# Patient Record
(demographics unavailable — no encounter records)

---

## 2024-10-24 NOTE — PHYSICAL EXAM
[Abdominal  Ascites] : ascites [General Appearance - Alert] : alert [General Appearance - In No Acute Distress] : in no acute distress [Sclera] : the sclera and conjunctiva were normal [Extraocular Movements] : extraocular movements were intact [] : no respiratory distress [Exaggerated Use Of Accessory Muscles For Inspiration] : no accessory muscle use [Auscultation Breath Sounds / Voice Sounds] : lungs were clear to auscultation bilaterally [Heart Rate And Rhythm] : heart rate was normal and rhythm regular [Bowel Sounds] : normal bowel sounds [Abdomen Soft] : soft [Abdomen Tenderness] : non-tender [Abdomen Mass (___ Cm)] : no abdominal mass palpated [Abnormal Walk] : normal gait [Skin Color & Pigmentation] : normal skin color and pigmentation [Oriented To Time, Place, And Person] : oriented to person, place, and time [Impaired Insight] : insight and judgment were intact [Affect] : the affect was normal [No Acute Distress] : no acute distress [No Respiratory Distress] : no respiratory distress [No Edema] : no edema [No Focal Deficits] : no focal deficits [Scleral Icterus] : No Scleral Icterus [Ascites Tense] : ascites is not tense [Jaundice] : No jaundice [Hallucinations] : ~T no ~M hallucinations [FreeTextEntry1] : No asterixis [Asterixis] : no asterixis [Depression] : no depression [Suicidal Ideation] : no suicidal ideation [de-identified] : +temporal muscle wasting [de-identified] : soft, nonprotuberant, +hernia over L mid-abdomen, +mild tenderness when pressing over hernia (easily reducible), no overlying skin changes

## 2024-10-24 NOTE — ASSESSMENT
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 Last seen on 9/30/24. - Discussed at liver selection meeting on 5/23/24 -- decision was made to DEFER pt at this time pending psychosocial clearance (RPP enrollment). Pt was counseled while inpatient on the need for RPP enrollment prior to OLT listing and he verbalized understanding.  #Decompensated cirrhosis 2/2 likely MASH: - Counseled pt to avoid hepatotoxins, alcohol use, herbal supplements. Limit acetaminophen to 2g/day. - Counseled pt on the importance of medication adherence, and to bring all his medication bottles in to every hepatology visit moving forward.  - HE: Continue Miralax 17g qd, titrate to 3-4 BMs/day (pt reports nausea with lactulose). Continue rifaximin 550mg BID.   - Ascites/Volume: Pt has been taking Lasix 40mg daily (instead of BID), aldactone 100mg daily. He states he urinates too frequently with lasix, so has not been taking PM dose. Check labs today; if Cr/lytes okay, then plan to increase Lasix to 40mg BID (pt states he is willing to try this now) and continue aldactone 100mg daily while on midodrine 10mg TID. Continue low Na diet. Monitor Cr (baseline Cr 0.7-0.8s). Continue serial LVP at [facility] (currently Q2wk) to manage early satiety/ventral hernia site discomfort. Anticipate that it will be difficult to manage pt's ascites/hernia given presence of chronic PVT. Could pt be a candidate for TIPS/DIPS? Check repeat CT A/P as below.  - Hx of SBP: Had E coli bacteremia 2/2 SBP (E coli resistant to Cipro/Bactrim) in 2/2024. Continue cefpodoxime 200mg daily for SBP ppx.  - Hx of EV bleed: EGD (4/17/24) => Diminutive EV; PHG; normal duodenum. Previously was on BB but this was stopped during [facility] admission in 4/2024; currently on midodrine for hypotension so will NOT resume BB (unless pt is able to be titrated off midodrine). Next EGD 4/2025.  - Hypotension: Continue midodrine 10mg TID, will continue at higher dose for now to facilitate diuresis.  - HCC screening: CT A/P w/wo contrast 7/2024 without focal liver lesion (AFP < 1.8 in 2/2024). Repeat imaging, AFP q6mo (next due in 1/2025).  - Chronic PVT: CT A/P (4/2024) showed main portal venous occlusive thrombosis with extension into the visualized bilateral portal veins. Was started on Eliquis during [facility] admission in 4/2024; given recent fall, he was discharged on 5/25 OFF of AC. Eliquis restarted at lower dose of 2.5mg BID on 6/3/24 -- continue. No falls since last visit. CT Abd w/wo contrast (7/2024) showed unchanged chronic calcified PVT (after ~3 months on AC). Obtain repeat CT A/P w/wo contrast now.  #Ventral Hernia:  - I informed the pt that given his decompensated cirrhosis, hernia repair at this time would be high-risk (likely to have further decompensation/risk of death post-op; also is not yet listed for OLT so would not have a backup plan if he does poorly post-op). Favor monitoring for now, with hernia repair done at time of OLT. - Plan to uptitrate diuretics as above.  LVP q1-2w as above.  - Check CT A/P as above to reassess chronic PVT; will bring pt for discussion at upcoming Liver selection meeting to see if perhaps we could consider pt for TIPS/DIPS (given his low MELD, HE has been well-controlled). - Pt requests prescription for new abd binder -- ordered.  #OLT candidacy: Eval ongoing.  Discussed at liver selection meeting on 5/23/24 -- decision was made to DEFER pt at this time pending psychosocial clearance (RPP enrollment). -- Continue RPP; follow up with Psych/SW for clearance. Transplant SW Asma to meet with pt after today's appt. Check PETH, UTox as per SW recs.  -- COL 5/24/24 (with 2d prep) => Hemorrhoids. Medium sized nonbleeding rectal varices. Mod amount of stool in entire colon interfering with visualization. 2mm sessile polyp in sigmoid colon removed (Path: Early sessile serrated polyp). Recommendations: Repeat colonoscopy for surveillance after transplant. No large masses found on this exam. Given that there was stool throughout the colon, could not rule out polyps < 6mm. Would recommend low residual diet and MiraLAX for 2 weeks as well as 2-day prep for next colonoscopy.  -- Underwent splinting of teeth #24, 25, 26 (due to aspiration risk) on 5/22/24 inpatient with Dental. Cleared from Dental standpoint for OLT. RECOMMENDATIONS: 1) Patient okay for liver transplant from dental standpoint. Recommended utilization of mouthguard with intubation. 2) Patient instructions given including soft food diet. 3) Follow up with outpatient dentist for splint removal 4) Definitive treatment of teeth #24,25,26 and comprehensive care s/p liver transplant and medical clearance.   Repeat labs today. Pt was instructed to bring in all med bottles to next visit. RTC in 6 weeks.

## 2024-10-24 NOTE — REVIEW OF SYSTEMS
[Tattoos] : tattoos [Fever] : no fever [Chills] : no chills [Night Sweats] : no night sweats [Sore throat] : no sore throat [Rhinorrhea] : no rhinorrhea [Sclera anicteric] : sclera icteric [Chest Pain] : no chest pain [Palpitations] : no palpitations [Wheezing] : no wheezing [Cough] : no cough [Nausea] : no nausea [Constipation] : no constipation [Diarrhea] : diarrhea [Vomiting] : no vomiting

## 2024-10-24 NOTE — HISTORY OF PRESENT ILLNESS
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 infectious inflammatory process cannot be excluded. Clinical correlation is recommended.  Although focal prominence arising at the anterior aspect of the mid gastric body may be related to transient contraction, focal mass cannot be excluded. Correlation with dedicated GI evaluation may be of help, if clinically indicated. 2. Intra-abdominal and mesenteric fat stranding is nonspecific in view of 3. Hepatic cirrhosis with fatty infiltration. 4. Splenomegaly, ascites, collateral tortuous venous vascular structures of the distal paraesophageal region may indicate portal hypertension. 5. Subacute to chronic right rib fractures with callus formation as above. 6. Left renal hypodense lesions may represent renal cyst, although complete evaluation is limited due to technique. The finding may be further characterized with renal ultrasound on outpatient basis, if clinically indicated. 7. Isodense areas seen within bilateral breast parenchyma may represent gynecomastia, although complete evaluation is limited due to technique. Clinical correlation should determine further need for dedicated breast imaging.  TTE 10/5/23 Borderline normal LV systolic function EF 51-55% Apical hypokinesis False tendon in L Vent apex, nonpathologic finding Mild (Gr1) LV diastolic function Mildly dilated L Atrium LA volume index = 34.99 (ULN = 34) RA nl in size and structure Nl RV size and systolic function PASP cannot be estimated d/t inadequate TR Doppler signal Mild thickening of the ant and post MV leaflets Mild mitral annular calcification Mild AV sclerosis w/o stenosis  EGD 24 Mild gastritis, Antrum Fundic gland polyps a dilated vein at Esophagus, r/o varix  LABS 24 A1c 5.1 AFP <1.8 Na 143 SCr 1.08 ALB 3.4 TB 2.1 INR 2.29 AST 48 ALT 28  INTERVAL HX  [FreeTextEntry2] : O

## 2024-11-08 NOTE — HISTORY OF PRESENT ILLNESS
[de-identified] : He is here for follow up.  He brought the medications  No new event  No NVD No fall or confusion No chest pain or SOB  No rectal bleeding

## 2024-11-27 NOTE — PHYSICAL EXAM
[Abdominal  Ascites] : ascites [General Appearance - Alert] : alert [General Appearance - In No Acute Distress] : in no acute distress [Sclera] : the sclera and conjunctiva were normal [Extraocular Movements] : extraocular movements were intact [] : no respiratory distress [Exaggerated Use Of Accessory Muscles For Inspiration] : no accessory muscle use [Auscultation Breath Sounds / Voice Sounds] : lungs were clear to auscultation bilaterally [Heart Rate And Rhythm] : heart rate was normal and rhythm regular [Bowel Sounds] : normal bowel sounds [Abdomen Soft] : soft [Abdomen Tenderness] : non-tender [Abdomen Mass (___ Cm)] : no abdominal mass palpated [Abnormal Walk] : normal gait [Skin Color & Pigmentation] : normal skin color and pigmentation [Oriented To Time, Place, And Person] : oriented to person, place, and time [Impaired Insight] : insight and judgment were intact [Affect] : the affect was normal [No Acute Distress] : no acute distress [No Respiratory Distress] : no respiratory distress [No Edema] : no edema [No Focal Deficits] : no focal deficits [Scleral Icterus] : No Scleral Icterus [Ascites Tense] : ascites is not tense [Jaundice] : No jaundice [Hallucinations] : ~T no ~M hallucinations [FreeTextEntry1] : No asterixis [Asterixis] : no asterixis [Depression] : no depression [Suicidal Ideation] : no suicidal ideation [de-identified] : +temporal muscle wasting [de-identified] : soft, nonprotuberant, +hernia over L mid-abdomen, +mild tenderness when pressing over hernia (easily reducible), no overlying skin changes
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
## 2025-01-07 NOTE — PHYSICAL EXAM
[Abdominal  Ascites] : ascites [General Appearance - Alert] : alert [General Appearance - In No Acute Distress] : in no acute distress [Sclera] : the sclera and conjunctiva were normal [Extraocular Movements] : extraocular movements were intact [] : no respiratory distress [Exaggerated Use Of Accessory Muscles For Inspiration] : no accessory muscle use [Auscultation Breath Sounds / Voice Sounds] : lungs were clear to auscultation bilaterally [Heart Rate And Rhythm] : heart rate was normal and rhythm regular [Bowel Sounds] : normal bowel sounds [Abdomen Soft] : soft [Abdomen Tenderness] : non-tender [Abdomen Mass (___ Cm)] : no abdominal mass palpated [Abnormal Walk] : normal gait [Skin Color & Pigmentation] : normal skin color and pigmentation [Oriented To Time, Place, And Person] : oriented to person, place, and time [Impaired Insight] : insight and judgment were intact [Affect] : the affect was normal [No Acute Distress] : no acute distress [No Respiratory Distress] : no respiratory distress [No Edema] : no edema [No Focal Deficits] : no focal deficits [Scleral Icterus] : No Scleral Icterus [Ascites Tense] : ascites is not tense [Jaundice] : No jaundice [Hallucinations] : ~T no ~M hallucinations [FreeTextEntry1] : No asterixis [Asterixis] : no asterixis [Depression] : no depression [Suicidal Ideation] : no suicidal ideation [de-identified] : +temporal muscle wasting [de-identified] : soft, nondistended (significantly improved compared to last visit), +hernia over L mid-abdomen, +mild tenderness when pressing over hernia (easily reducible), no overlying skin changes [de-identified] : AOx3

## 2025-01-07 NOTE — ASSESSMENT
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 Last seen on 9/30/24. - Discussed at liver selection meeting on 5/23/24 -- decision was made to DEFER pt at this time pending psychosocial clearance (RPP enrollment). Pt was counseled while inpatient on the need for RPP enrollment prior to OLT listing and he verbalized understanding.  #Decompensated cirrhosis 2/2 likely MASH: MELD = 13 (based on labs from 11/6/24) - HE: Continue Miralax 17g qd, titrate to 3-4 BMs/day (pt reports nausea with lactulose). Continue rifaximin 550mg BID.   - Ascites/Volume: Significantly improved with increase in Lasix to 40mg BID (minimal ascites for paracentesis). Continue aldactone 50mg daily. Check MELD labs today. Continue low Na diet. Monitor Cr (baseline Cr 0.7-0.8s). Continue LVP PRN at [facility] to manage early satiety/ventral hernia site discomfort.  - Hx of SBP: Had E coli bacteremia 2/2 SBP (E coli resistant to Cipro/Bactrim) in 2/2024. Continue cefpodoxime 200mg daily for SBP ppx.  - Hx of EV bleed: EGD (4/17/24) => Diminutive EV; PHG; normal duodenum. Previously was on BB but this was stopped during [facility] admission in 4/2024; currently on midodrine for hypotension so will NOT resume BB (unless pt is able to be titrated off midodrine). Next EGD 4/2025.  - Hypotension: Continue midodrine 10mg TID; consider decreasing at next visit pending BP trend.  - HCC screening: CT A/P w/wo contrast 11/2024 without focal liver lesion. Check AFP today with labs. Repeat imaging, AFP q6mo (next due in 5/2025).  - Chronic PVT: CT A/P (4/2024) showed main portal venous occlusive thrombosis with extension into the visualized bilateral portal veins. Was started on Eliquis during [facility] admission in 4/2024; given recent fall, he was discharged on 5/25 OFF of AC. Eliquis restarted at lower dose of 2.5mg BID on 6/3/24 -- continue. No falls since last visit. CT Abd w/wo contrast (11/2024) showed unchanged chronic calcified PVT.  - Counseled pt to avoid hepatotoxins, alcohol use, herbal supplements. Limit acetaminophen to 2g/day. - Counseled pt on the importance of medication adherence, and to bring all his medication bottles in to every hepatology visit moving forward.  #Ventral Hernia:  - I informed the pt that given his decompensated cirrhosis, hernia repair at this time would be high-risk (likely to have further decompensation/risk of death post-op; also is not yet listed for OLT so would not have a backup plan if he does poorly post-op). Favor monitoring for now, with hernia repair done at time of OLT. - Ascites well controlled with uptitration of diuretics as above.  LVP PRN as above.   #OLT candidacy: Eval ongoing.  Discussed at liver selection meeting on 5/23/24 -- decision was made to DEFER pt at this time pending psychosocial clearance (RPP enrollment). - Follow up with Psych/SW for clearance. Transplant SW Asma will send referral to [facility] for pt. - Check PETH, UTox as per SW recs.  -- COL 5/24/24 (with 2d prep) => Hemorrhoids. Medium sized nonbleeding rectal varices. Mod amount of stool in entire colon interfering with visualization. 2mm sessile polyp in sigmoid colon removed (Path: Early sessile serrated polyp). Recommendations: Repeat colonoscopy for surveillance after transplant. No large masses found on this exam. Given that there was stool throughout the colon, could not rule out polyps < 6mm. Would recommend low residual diet and MiraLAX for 2 weeks as well as 2-day prep for next colonoscopy.  -- Underwent splinting of teeth #24, 25, 26 (due to aspiration risk) on 5/22/24 inpatient with Dental. Cleared from Dental standpoint for OLT. RECOMMENDATIONS: 1) Patient okay for liver transplant from dental standpoint. Recommended utilization of mouthguard with intubation. 2) Patient instructions given including soft food diet. 3) Follow up with outpatient dentist for splint removal 4) Definitive treatment of teeth #24,25,26 and comprehensive care s/p liver transplant and medical clearance.   Repeat labs today. Pt was instructed to bring in all med bottles to next visit. RTC in 6 weeks.
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
## 2025-02-18 NOTE — PHYSICAL EXAM
[Abdominal  Ascites] : ascites [General Appearance - Alert] : alert [General Appearance - In No Acute Distress] : in no acute distress [Sclera] : the sclera and conjunctiva were normal [Extraocular Movements] : extraocular movements were intact [] : no respiratory distress [Exaggerated Use Of Accessory Muscles For Inspiration] : no accessory muscle use [Auscultation Breath Sounds / Voice Sounds] : lungs were clear to auscultation bilaterally [Heart Rate And Rhythm] : heart rate was normal and rhythm regular [Bowel Sounds] : normal bowel sounds [Abdomen Soft] : soft [Abdomen Tenderness] : non-tender [Abdomen Mass (___ Cm)] : no abdominal mass palpated [Abnormal Walk] : normal gait [Skin Color & Pigmentation] : normal skin color and pigmentation [Oriented To Time, Place, And Person] : oriented to person, place, and time [Impaired Insight] : insight and judgment were intact [Affect] : the affect was normal [No Acute Distress] : no acute distress [No Respiratory Distress] : no respiratory distress [No Edema] : no edema [No Focal Deficits] : no focal deficits [Scleral Icterus] : No Scleral Icterus [Ascites Tense] : ascites is not tense [Jaundice] : No jaundice [Hallucinations] : ~T no ~M hallucinations [FreeTextEntry1] : No asterixis [Asterixis] : no asterixis [Depression] : no depression [Suicidal Ideation] : no suicidal ideation [de-identified] : +temporal muscle wasting [de-identified] : soft, nondistended, +hernia over L mid-abdomen (easily reducible), no overlying skin changes [de-identified] : AOx3

## 2025-02-18 NOTE — ASSESSMENT
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 Last seen on 9/30/24. - Discussed at liver selection meeting on 5/23/24 -- decision was made to DEFER pt at this time pending psychosocial clearance (RPP enrollment). Pt was counseled while inpatient on the need for RPP enrollment prior to OLT listing and he verbalized understanding.  #Decompensated cirrhosis 2/2 likely MASH: MELD = 14 (based on labs from 1/7/25) - HE: Continue Miralax 17g qd, titrate to 3-4 BMs/day (pt reports nausea with lactulose). Continue rifaximin 550mg BID.   - Ascites/Volume: Well-controlled with Lasix 40mg BID, aldactone 50mg daily (minimal ascites for paracentesis). Check MELD labs today. Continue low Na diet. Monitor Cr (baseline Cr 0.7-0.8s). Continue LVP PRN at [facility].  - Hx of SBP: Had E coli bacteremia 2/2 SBP (E coli resistant to Cipro/Bactrim) in 2/2024. Continue cefpodoxime 200mg daily for SBP ppx.  - Hx of EV bleed: EGD (4/17/24) => Diminutive EV; PHG; normal duodenum. Previously was on BB but this was stopped during [facility] admission in 4/2024; currently on midodrine for hypotension so will NOT resume BB (unless pt is able to be titrated off midodrine). Next EGD 4/2025.  - Hypotension: Continue midodrine 10mg TID; consider decreasing at next visit pending BP trend.  - HCC screening: CT A/P w/wo contrast 11/2024 without focal liver lesion. Check AFP today with labs. Repeat imaging, AFP q6mo (next due in 5/2025).  - Chronic PVT: CT A/P (4/2024) showed main portal venous occlusive thrombosis with extension into the visualized bilateral portal veins. Was started on Eliquis during [facility] admission in 4/2024; given recent fall, he was discharged on 5/25 OFF of AC. Eliquis restarted at lower dose of 2.5mg BID on 6/3/24 -- continue. No falls since last visit. CT Abd w/wo contrast (11/2024) showed unchanged chronic calcified PVT.  - Counseled pt to avoid hepatotoxins, alcohol use, herbal supplements. Limit acetaminophen to 2g/day. - Counseled pt on the importance of medication adherence, and to bring all his medication bottles in to every hepatology visit moving forward.  #Ventral Hernia:  - I informed the pt that given his decompensated cirrhosis, hernia repair at this time would be high-risk (likely to have further decompensation/risk of death post-op; also is not yet listed for OLT so would not have a backup plan if he does poorly post-op). Favor monitoring for now, with hernia repair done at time of OLT. - Ascites well controlled with uptitration of diuretics as above.  LVP PRN as above.   #OLT candidacy: Eval ongoing.  Discussed at liver selection meeting on 5/23/24 -- decision was made to DEFER pt at this time pending psychosocial clearance (RPP enrollment). - Follow up with Psych/SW for clearance. Enrolled in [employer] since Jan 2025. - Check PETH, UTox as per SW recs.  -- COL 5/24/24 (with 2d prep) => Hemorrhoids. Medium sized nonbleeding rectal varices. Mod amount of stool in entire colon interfering with visualization. 2mm sessile polyp in sigmoid colon removed (Path: Early sessile serrated polyp). Recommendations: Repeat colonoscopy for surveillance after transplant. No large masses found on this exam. Given that there was stool throughout the colon, could not rule out polyps < 6mm. Would recommend low residual diet and MiraLAX for 2 weeks as well as 2-day prep for next colonoscopy.  -- Underwent splinting of teeth #24, 25, 26 (due to aspiration risk) on 5/22/24 inpatient with Dental. Cleared from Dental standpoint for OLT. RECOMMENDATIONS: 1) Patient okay for liver transplant from dental standpoint. Recommended utilization of mouthguard with intubation. 2) Patient instructions given including soft food diet. 3) Follow up with outpatient dentist for splint removal 4) Definitive treatment of teeth #24,25,26 and comprehensive care s/p liver transplant and medical clearance.   Repeat labs today. Pt was instructed to bring in all med bottles to next visit. RTC in 6 weeks.

## 2025-04-07 NOTE — HISTORY OF PRESENT ILLNESS
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 although complete evaluation is limited due to technique. Clinical correlation should determine further need for dedicated breast imaging.  TTE 10/5/23 Borderline normal LV systolic function EF 51-55% Apical hypokinesis False tendon in L Vent apex, nonpathologic finding Mild (Gr1) LV diastolic function Mildly dilated L Atrium LA volume index = 34.99 (ULN = 34) RA nl in size and structure Nl RV size and systolic function PASP cannot be estimated d/t inadequate TR Doppler signal Mild thickening of the ant and post MV leaflets Mild mitral annular calcification Mild AV sclerosis w/o stenosis

## 2025-04-07 NOTE — PHYSICAL EXAM
[Abdominal  Ascites] : ascites [General Appearance - Alert] : alert [General Appearance - In No Acute Distress] : in no acute distress [Sclera] : the sclera and conjunctiva were normal [Extraocular Movements] : extraocular movements were intact [] : no respiratory distress [Exaggerated Use Of Accessory Muscles For Inspiration] : no accessory muscle use [Auscultation Breath Sounds / Voice Sounds] : lungs were clear to auscultation bilaterally [Heart Rate And Rhythm] : heart rate was normal and rhythm regular [Bowel Sounds] : normal bowel sounds [Abdomen Soft] : soft [Abdomen Tenderness] : non-tender [Abdomen Mass (___ Cm)] : no abdominal mass palpated [Abnormal Walk] : normal gait [Skin Color & Pigmentation] : normal skin color and pigmentation [Oriented To Time, Place, And Person] : oriented to person, place, and time [Impaired Insight] : insight and judgment were intact [Affect] : the affect was normal [No Acute Distress] : no acute distress [No Respiratory Distress] : no respiratory distress [No Edema] : no edema [No Focal Deficits] : no focal deficits [Scleral Icterus] : No Scleral Icterus [Ascites Tense] : ascites is not tense [Jaundice] : No jaundice [Hallucinations] : ~T no ~M hallucinations [FreeTextEntry1] : No asterixis [Asterixis] : no asterixis [Depression] : no depression [Suicidal Ideation] : no suicidal ideation [de-identified] : +temporal muscle wasting [de-identified] : soft, nondistended, +hernia over L mid-abdomen (easily reducible), no overlying skin changes [de-identified] : AOx3

## 2025-04-07 NOTE — ASSESSMENT
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 Last seen on 9/30/24. - Discussed at liver selection meeting on 5/23/24 -- decision was made to DEFER pt at this time pending psychosocial clearance (RPP enrollment). Pt was counseled while inpatient on the need for RPP enrollment prior to OLT listing and he verbalized understanding.  #Decompensated cirrhosis 2/2 likely MASH: MELD = 14 (based on labs from 1/7/25) - HE: Continue Miralax 17g qd, titrate to 3-4 BMs/day (pt reports nausea with lactulose). Continue rifaximin 550mg BID.   - Ascites/Volume: Well-controlled with Lasix 40mg BID, aldactone 50mg daily (minimal ascites for paracentesis). Check MELD labs today. Continue low Na diet. Monitor Cr (baseline Cr 0.7-0.8s). Continue LVP PRN at [facility].  - Hx of SBP: Had E coli bacteremia 2/2 SBP (E coli resistant to Cipro/Bactrim) in 2/2024. Continue cefpodoxime 200mg daily for SBP ppx.  - Hx of EV bleed: EGD (4/17/24) => Diminutive EV; PHG; normal duodenum. Previously was on BB but this was stopped during [facility] admission in 4/2024; currently on midodrine for hypotension so will NOT resume BB (unless pt is able to be titrated off midodrine). Next EGD 4/2025.  - Hypotension: Continue midodrine 10mg TID; consider decreasing at next visit pending BP trend.  - HCC screening: CT A/P w/wo contrast 11/2024 without focal liver lesion. Check AFP today with labs. Repeat imaging, AFP q6mo (next due in 5/2025).  - Chronic PVT: CT A/P (4/2024) showed main portal venous occlusive thrombosis with extension into the visualized bilateral portal veins. Was started on Eliquis during [facility] admission in 4/2024; given recent fall, he was discharged on 5/25 OFF of AC. Eliquis restarted at lower dose of 2.5mg BID on 6/3/24 -- continue. No falls since last visit. CT Abd w/wo contrast (11/2024) showed unchanged chronic calcified PVT.  - Counseled pt to avoid hepatotoxins, alcohol use, herbal supplements. Limit acetaminophen to 2g/day. - Counseled pt on the importance of medication adherence, and to bring all his medication bottles in to every hepatology visit moving forward.  #Ventral Hernia:  - I informed the pt that given his decompensated cirrhosis, hernia repair at this time would be high-risk (likely to have further decompensation/risk of death post-op; also is not yet listed for OLT so would not have a backup plan if he does poorly post-op). Favor monitoring for now, with hernia repair done at time of OLT. - Ascites well controlled with uptitration of diuretics as above.  LVP PRN as above.   #OLT candidacy: Eval ongoing.  Discussed at liver selection meeting on 5/23/24 -- decision was made to DEFER pt at this time pending psychosocial clearance (RPP enrollment). - Follow up with Psych/SW for clearance. Enrolled in [employer] since Jan 2025. - Check PETH, UTox as per SW recs.  -- COL 5/24/24 (with 2d prep) => Hemorrhoids. Medium sized nonbleeding rectal varices. Mod amount of stool in entire colon interfering with visualization. 2mm sessile polyp in sigmoid colon removed (Path: Early sessile serrated polyp). Recommendations: Repeat colonoscopy for surveillance after transplant. No large masses found on this exam. Given that there was stool throughout the colon, could not rule out polyps < 6mm. Would recommend low residual diet and MiraLAX for 2 weeks as well as 2-day prep for next colonoscopy.  -- Underwent splinting of teeth #24, 25, 26 (due to aspiration risk) on 5/22/24 inpatient with Dental. Cleared from Dental standpoint for OLT. RECOMMENDATIONS: 1) Patient okay for liver transplant from dental standpoint. Recommended utilization of mouthguard with intubation. 2) Patient instructions given including soft food diet. 3) Follow up with outpatient dentist for splint removal 4) Definitive treatment of teeth #24,25,26 and comprehensive care s/p liver transplant and medical clearance.   Repeat labs today. Pt was instructed to bring in all med bottles to next visit. RTC in 6 weeks.

## 2025-04-21 NOTE — ASSESSMENT
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 Last seen on 4/7/25.  - Due to ventral hernia being non-reducible and slightly tender for the last few days, instructed patient to go to [facility] ED for further imaging/evaluation.   - Discussed at liver selection meeting on 5/23/24 -- decision was made to DEFER pt at this time pending psychosocial clearance (RPP enrollment). Pt was counseled while inpatient on the need for RPP enrollment prior to OLT listing and he verbalized understanding.  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 Continue rifaximin 550mg BID.  - Ascites/Volume: Well-controlled with Lasix 40mg BID, Aldactone 100mg daily (minimal ascites for paracentesis). Check MELD labs today. Continue low Na diet. Monitor Cr (baseline Cr 0.7-0.8s). Continue LVP PRN at [facility].  - Hx of SBP: Had E coli bacteremia 2/2 SBP (E coli resistant to Cipro/Bactrim) in 2/2024. Continue cefpodoxime 200mg daily for SBP ppx.  - Hx of EV bleed: EGD (4/17/24) => Diminutive EV; PHG; normal duodenum. Previously was on BB but this was stopped during [facility] admission in 4/2024; currently on midodrine for hypotension so will NOT resume BB (unless pt is able to be titrated off midodrine). Next EGD scheduled for 5/15/25.  - Hypotension (resolved): Instructed to stop Midodrine 10mg TID and monitor BP trend.  - HCC screening: CT A/P w/wo contrast 11/2024 without focal liver lesion. Repeat imaging, AFP q6mo (next due in 5/2025).  - Chronic PVT: CT A/P (4/2024) showed main portal venous occlusive thrombosis with extension into the visualized bilateral portal veins. Was started on Eliquis during [facility] admission in 4/2024; given recent fall, he was discharged on 5/25 OFF of AC. Eliquis restarted at lower dose of 2.5mg BID on 6/3/24 -- continue. No falls since last visit. CT Abd w/wo contrast (11/2024) showed unchanged chronic calcified PVT.  - Counseled pt to avoid hepatotoxins, alcohol use, herbal supplements. Limit acetaminophen to 2g/day. - Counseled pt on the importance of medication adherence, and to bring all his medication bottles in to every hepatology visit moving forward.  #Ventral Hernia: - I informed the pt that given his decompensated cirrhosis, hernia repair at this time would be high-risk (likely to have further decompensation/risk of death post-op; also is not yet listed for OLT so would not have a backup plan if he does poorly post-op).  - Due to ventral hernia being slightly tender and non-reducible, instructed patient to go to [facility] ED for CT A/P imaging and further evaluation.   #OLT candidacy: Eval ongoing. Discussed at liver selection meeting on 5/23/24 -- decision was made to DEFER pt at this time pending psychosocial clearance (RPP enrollment). - Follow up with Psych/SW for clearance. Enrolled in [employer] since Jan 2025. - Check PETH, UTox as per  recs.  -- COL 5/24/24 (with 2d prep) => Hemorrhoids. Medium sized nonbleeding rectal varices. Mod amount of stool in entire colon interfering with visualization. 2mm sessile polyp in sigmoid colon removed (Path: Early sessile serrated polyp). Recommendations: Repeat colonoscopy for surveillance after transplant. No large masses found on this exam. Given that there was stool throughout the colon, could not rule out polyps < 6mm. Would recommend low residual diet and MiraLAX for 2 weeks as well as 2-day prep for next colonoscopy.

## 2025-04-21 NOTE — HISTORY OF PRESENT ILLNESS
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 setting. 7) Other findings as above.  CT Abd Pelvis IV Contrast only 10/1/2023: IMPRESSION: 1. Mildly distended small bowel loops at the mid abdominal region may represent small bowel obstruction with transition zone at the level of the umbilical hernia containing small bowel and fluid.  Although some of the diffuse wall thickening of the colon and rectum and proximal jejunum as above may be related to suboptimal distention and presence of ascites, similar findings may also be associated with venous congestion secondary to portal hypertension. Superimposed infectious inflammatory process cannot be excluded. Clinical correlation is recommended.  Although focal prominence arising at the anterior aspect of the mid gastric body may be related to transient contraction, focal mass cannot be excluded. Correlation with dedicated GI evaluation may be of help, if clinically indicated. 2. Intra-abdominal and mesenteric fat stranding is nonspecific in view of 3. Hepatic cirrhosis with fatty infiltration. 4. Splenomegaly, ascites, collateral tortuous venous vascular structures of the distal paraesophageal region may indicate portal hypertension. 5. Subacute to chronic right rib fractures with callus formation as above. 6. Left renal hypodense lesions may represent renal cyst, although complete evaluation is limited due to technique. The finding may be further characterized with renal ultrasound on outpatient basis, if clinically indicated. 7. Isodense areas seen within bilateral breast parenchyma may represent gynecomastia, although complete evaluation is limited due to technique. Clinical correlation should determine further need for dedicated breast imaging.  TTE 10/5/23 Borderline normal LV systolic function EF 51-55% Apical hypokinesis False tendon in L Vent apex, nonpathologic finding Mild (Gr1) LV diastolic function Mildly dilated L Atrium LA volume index = 34.99 (ULN = 34) RA nl in size and structure Nl RV size and systolic function PASP cannot be estimated d/t inadequate TR Doppler signal Mild thickening of the ant and post MV leaflets Mild mitral annular calcification Mild AV sclerosis w/o stenosis

## 2025-04-21 NOTE — PHYSICAL EXAM
[General Appearance - Alert] : alert [General Appearance - In No Acute Distress] : in no acute distress [] : no respiratory distress [Respiration, Rhythm And Depth] : normal respiratory rhythm and effort [Auscultation Breath Sounds / Voice Sounds] : lungs were clear to auscultation bilaterally [Heart Rate And Rhythm] : heart rate was normal and rhythm regular [Heart Sounds] : normal S1 and S2 [Edema] : there was no peripheral edema [Bowel Sounds] : normal bowel sounds [Abdomen Soft] : soft [Scleral Icterus] : No Scleral Icterus [Asterixis] : no asterixis observed [Jaundice] : No jaundice [FreeTextEntry1] : Tenderness within ventral hernia region and hernia unable to be reduced; no peritoneal signs

## 2025-05-08 NOTE — ASSESSMENT
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 Last seen on 4/7/25.  - Due to ventral hernia being non-reducible and slightly tender for the last few days, instructed patient to go to [facility] ED for further imaging/evaluation.   - Discussed at liver selection meeting on 5/23/24 -- decision was made to DEFER pt at this time pending psychosocial clearance (RPP enrollment). Pt was counseled while inpatient on the need for RPP enrollment prior to OLT listing and he verbalized understanding.  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 Continue rifaximin 550mg BID.  - Ascites/Volume: Well-controlled with Lasix 40mg BID, Aldactone 100mg daily (minimal ascites for paracentesis). Check MELD labs today. Continue low Na diet. Monitor Cr (baseline Cr 0.7-0.8s). Continue LVP PRN at [facility].  - Hx of SBP: Had E coli bacteremia 2/2 SBP (E coli resistant to Cipro/Bactrim) in 2/2024. Continue cefpodoxime 200mg daily for SBP ppx.  - Hx of EV bleed: EGD (4/17/24) => Diminutive EV; PHG; normal duodenum. Previously was on BB but this was stopped during [facility] admission in 4/2024; currently on midodrine for hypotension so will NOT resume BB (unless pt is able to be titrated off midodrine). Next EGD scheduled for 5/15/25.  - Hypotension (resolved): Instructed to stop Midodrine 10mg TID and monitor BP trend.  - HCC screening: CT A/P w/wo contrast 11/2024 without focal liver lesion. Repeat imaging, AFP q6mo (next due in 5/2025).  - Chronic PVT: CT A/P (4/2024) showed main portal venous occlusive thrombosis with extension into the visualized bilateral portal veins. Was started on Eliquis during [facility] admission in 4/2024; given recent fall, he was discharged on 5/25 OFF of AC. Eliquis restarted at lower dose of 2.5mg BID on 6/3/24 -- continue. No falls since last visit. CT Abd w/wo contrast (11/2024) showed unchanged chronic calcified PVT.  - Counseled pt to avoid hepatotoxins, alcohol use, herbal supplements. Limit acetaminophen to 2g/day. - Counseled pt on the importance of medication adherence, and to bring all his medication bottles in to every hepatology visit moving forward.  #Ventral Hernia: - I informed the pt that given his decompensated cirrhosis, hernia repair at this time would be high-risk (likely to have further decompensation/risk of death post-op; also is not yet listed for OLT so would not have a backup plan if he does poorly post-op).  - Due to ventral hernia being slightly tender and non-reducible, instructed patient to go to [facility] ED for CT A/P imaging and further evaluation.   #OLT candidacy: Eval ongoing. Discussed at liver selection meeting on 5/23/24 -- decision was made to DEFER pt at this time pending psychosocial clearance (RPP enrollment). - Follow up with Psych/SW for clearance. Enrolled in [employer] since Jan 2025. - Check PETH, UTox as per  recs.  -- COL 5/24/24 (with 2d prep) => Hemorrhoids. Medium sized nonbleeding rectal varices. Mod amount of stool in entire colon interfering with visualization. 2mm sessile polyp in sigmoid colon removed (Path: Early sessile serrated polyp). Recommendations: Repeat colonoscopy for surveillance after transplant. No large masses found on this exam. Given that there was stool throughout the colon, could not rule out polyps < 6mm. Would recommend low residual diet and MiraLAX for 2 weeks as well as 2-day prep for next colonoscopy.

## 2025-05-08 NOTE — PLAN
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 Last seen on 4/7/25.  - Due to ventral hernia being non-reducible and slightly tender for the last few days, instructed patient to go to [facility] ED for further imaging/evaluation.  - Discussed at liver selection meeting on 5/23/24 -- decision was made to DEFER pt at this time pending psychosocial clearance (RPP enrollment). Pt was counseled while inpatient on the need for RPP enrollment prior to OLT listing and he verbalized understanding.  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 Continue rifaximin 550mg BID.  - Ascites/Volume: Well-controlled with Lasix 40mg BID, Aldactone 100mg daily (minimal ascites for paracentesis). Check MELD labs today. Continue low Na diet. Monitor Cr (baseline Cr 0.7-0.8s). Continue LVP PRN at [facility].  - Hx of SBP: Had E coli bacteremia 2/2 SBP (E coli resistant to Cipro/Bactrim) in 2/2024. Continue cefpodoxime 200mg daily for SBP ppx.  - Hx of EV bleed: EGD (4/17/24) => Diminutive EV; PHG; normal duodenum. Previously was on BB but this was stopped during [facility] admission in 4/2024; currently on midodrine for hypotension so will NOT resume BB (unless pt is able to be titrated off midodrine). Next EGD scheduled for 5/15/25.  - Hypotension (resolved): Instructed to stop Midodrine 10mg TID and monitor BP trend.  - HCC screening: CT A/P w/wo contrast 11/2024 without focal liver lesion. Repeat imaging, AFP q6mo (next due in 5/2025).  - Chronic PVT: CT A/P (4/2024) showed main portal venous occlusive thrombosis with extension into the visualized bilateral portal veins. Was started on Eliquis during [facility] admission in 4/2024; given recent fall, he was discharged on 5/25 OFF of AC. Eliquis restarted at lower dose of 2.5mg BID on 6/3/24 -- continue. No falls since last visit. CT Abd w/wo contrast (11/2024) showed unchanged chronic calcified PVT.  - Counseled pt to avoid hepatotoxins, alcohol use, herbal supplements. Limit acetaminophen to 2g/day. - Counseled pt on the importance of medication adherence, and to bring all his medication bottles in to every hepatology visit moving forward.  #Ventral Hernia: - I informed the pt that given his decompensated cirrhosis, hernia repair at this time would be high-risk (likely to have further decompensation/risk of death post-op; also is not yet listed for OLT so would not have a backup plan if he does poorly post-op). - Due to ventral hernia being slightly tender and non-reducible, instructed patient to go to [facility] ED for CT A/P imaging and further evaluation.  #OLT candidacy: Eval ongoing. Discussed at liver selection meeting on 5/23/24 -- decision was made to DEFER pt at this time pending psychosocial clearance (RPP enrollment). - Follow up with Psych/SW for clearance. Enrolled in [employer] since Jan 2025. - Check PETH, UTox as per SW recs.  -- COL 5/24/24 (with 2d prep) => Hemorrhoids. Medium sized nonbleeding rectal varices. Mod amount of stool in entire colon interfering with visualization. 2mm sessile polyp in sigmoid colon removed (Path: Early sessile serrated polyp). Recommendations: Repeat colonoscopy for surveillance after transplant. No large masses found on this exam. Given that there was stool throughout the colon, could not rule out polyps < 6mm. Would recommend low residual diet and MiraLAX for 2 weeks as well as 2-day prep for next colonoscopy.  Labs and drug urine today and US soon.  RTC 6 weeks.
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
## 2025-05-08 NOTE — PLAN
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 Last seen on 4/7/25.  - Due to ventral hernia being non-reducible and slightly tender for the last few days, instructed patient to go to [facility] ED for further imaging/evaluation.  - Discussed at liver selection meeting on 5/23/24 -- decision was made to DEFER pt at this time pending psychosocial clearance (RPP enrollment). Pt was counseled while inpatient on the need for RPP enrollment prior to OLT listing and he verbalized understanding.  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 Continue rifaximin 550mg BID.  - Ascites/Volume: Well-controlled with Lasix 40mg BID, Aldactone 100mg daily (minimal ascites for paracentesis). Check MELD labs today. Continue low Na diet. Monitor Cr (baseline Cr 0.7-0.8s). Continue LVP PRN at [facility].  - Hx of SBP: Had E coli bacteremia 2/2 SBP (E coli resistant to Cipro/Bactrim) in 2/2024. Continue cefpodoxime 200mg daily for SBP ppx.  - Hx of EV bleed: EGD (4/17/24) => Diminutive EV; PHG; normal duodenum. Previously was on BB but this was stopped during [facility] admission in 4/2024; currently on midodrine for hypotension so will NOT resume BB (unless pt is able to be titrated off midodrine). Next EGD scheduled for 5/15/25.  - Hypotension (resolved): Instructed to stop Midodrine 10mg TID and monitor BP trend.  - HCC screening: CT A/P w/wo contrast 11/2024 without focal liver lesion. Repeat imaging, AFP q6mo (next due in 5/2025).  - Chronic PVT: CT A/P (4/2024) showed main portal venous occlusive thrombosis with extension into the visualized bilateral portal veins. Was started on Eliquis during [facility] admission in 4/2024; given recent fall, he was discharged on 5/25 OFF of AC. Eliquis restarted at lower dose of 2.5mg BID on 6/3/24 -- continue. No falls since last visit. CT Abd w/wo contrast (11/2024) showed unchanged chronic calcified PVT.  - Counseled pt to avoid hepatotoxins, alcohol use, herbal supplements. Limit acetaminophen to 2g/day. - Counseled pt on the importance of medication adherence, and to bring all his medication bottles in to every hepatology visit moving forward.  #Ventral Hernia: - I informed the pt that given his decompensated cirrhosis, hernia repair at this time would be high-risk (likely to have further decompensation/risk of death post-op; also is not yet listed for OLT so would not have a backup plan if he does poorly post-op). - Due to ventral hernia being slightly tender and non-reducible, instructed patient to go to [facility] ED for CT A/P imaging and further evaluation.  #OLT candidacy: Eval ongoing. Discussed at liver selection meeting on 5/23/24 -- decision was made to DEFER pt at this time pending psychosocial clearance (RPP enrollment). - Follow up with Psych/SW for clearance. Enrolled in [employer] since Jan 2025. - Check PETH, UTox as per SW recs.  -- COL 5/24/24 (with 2d prep) => Hemorrhoids. Medium sized nonbleeding rectal varices. Mod amount of stool in entire colon interfering with visualization. 2mm sessile polyp in sigmoid colon removed (Path: Early sessile serrated polyp). Recommendations: Repeat colonoscopy for surveillance after transplant. No large masses found on this exam. Given that there was stool throughout the colon, could not rule out polyps < 6mm. Would recommend low residual diet and MiraLAX for 2 weeks as well as 2-day prep for next colonoscopy.  Labs and drug urine today and US soon.  RTC 6 weeks.

## 2025-05-08 NOTE — ASSESSMENT
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 Last seen on 4/7/25.  - Due to ventral hernia being non-reducible and slightly tender for the last few days, instructed patient to go to [facility] ED for further imaging/evaluation.   - Discussed at liver selection meeting on 5/23/24 -- decision was made to DEFER pt at this time pending psychosocial clearance (RPP enrollment). Pt was counseled while inpatient on the need for RPP enrollment prior to OLT listing and he verbalized understanding.  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 Continue rifaximin 550mg BID.  - Ascites/Volume: Well-controlled with Lasix 40mg BID, Aldactone 100mg daily (minimal ascites for paracentesis). Check MELD labs today. Continue low Na diet. Monitor Cr (baseline Cr 0.7-0.8s). Continue LVP PRN at [facility].  - Hx of SBP: Had E coli bacteremia 2/2 SBP (E coli resistant to Cipro/Bactrim) in 2/2024. Continue cefpodoxime 200mg daily for SBP ppx.  - Hx of EV bleed: EGD (4/17/24) => Diminutive EV; PHG; normal duodenum. Previously was on BB but this was stopped during [facility] admission in 4/2024; currently on midodrine for hypotension so will NOT resume BB (unless pt is able to be titrated off midodrine). Next EGD scheduled for 5/15/25.  - Hypotension (resolved): Instructed to stop Midodrine 10mg TID and monitor BP trend.  - HCC screening: CT A/P w/wo contrast 11/2024 without focal liver lesion. Repeat imaging, AFP q6mo (next due in 5/2025).  - Chronic PVT: CT A/P (4/2024) showed main portal venous occlusive thrombosis with extension into the visualized bilateral portal veins. Was started on Eliquis during [facility] admission in 4/2024; given recent fall, he was discharged on 5/25 OFF of AC. Eliquis restarted at lower dose of 2.5mg BID on 6/3/24 -- continue. No falls since last visit. CT Abd w/wo contrast (11/2024) showed unchanged chronic calcified PVT.  - Counseled pt to avoid hepatotoxins, alcohol use, herbal supplements. Limit acetaminophen to 2g/day. - Counseled pt on the importance of medication adherence, and to bring all his medication bottles in to every hepatology visit moving forward.  #Ventral Hernia: - I informed the pt that given his decompensated cirrhosis, hernia repair at this time would be high-risk (likely to have further decompensation/risk of death post-op; also is not yet listed for OLT so would not have a backup plan if he does poorly post-op).  - Due to ventral hernia being slightly tender and non-reducible, instructed patient to go to [facility] ED for CT A/P imaging and further evaluation.   #OLT candidacy: Eval ongoing. Discussed at liver selection meeting on 5/23/24 -- decision was made to DEFER pt at this time pending psychosocial clearance (RPP enrollment). - Follow up with Psych/SW for clearance. Enrolled in [employer] since Jan 2025. - Check PETH, UTox as per  recs.  -- COL 5/24/24 (with 2d prep) => Hemorrhoids. Medium sized nonbleeding rectal varices. Mod amount of stool in entire colon interfering with visualization. 2mm sessile polyp in sigmoid colon removed (Path: Early sessile serrated polyp). Recommendations: Repeat colonoscopy for surveillance after transplant. No large masses found on this exam. Given that there was stool throughout the colon, could not rule out polyps < 6mm. Would recommend low residual diet and MiraLAX for 2 weeks as well as 2-day prep for next colonoscopy.
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
## 2025-05-19 NOTE — ADDENDUM
[FreeTextEntry1] :  I, HE ANA, am scribing for and in the presence of Dr. Kothari for the following sections: HISTORY OF PRESENT ILLNESS, PAST MEDICAL HISTORY, PAST SURGICAL HISTORY, FAMILY/SOCIAL HISTORY, REVIEW OF SYSTEMS, VITAL SIGNS, PHYSICAL EXAM, DISPOSITION.Reviewed current treatment plan, including medications / surgical intervention / lifestyle modifications where indicated. Reviewed imaging, laboratory results, or other diagnostics as applicable. Addressed patient concerns, including potential complications, risk factors, or alternative treatment options as applicable. Provided detailed education on pre- or post-operative instructions,and expected outcome as applicable. Instructed pt to call office for questions or concerns. Instructed patient to schedule follow-up appointment as recommended. Referrals and testing ordered where indicated. Plan of care reviewed with patient, and questions answered. The total time spent with patient included more than 50% of the time dedicated to counseling and coordination of care.

## 2025-05-19 NOTE — PHYSICAL EXAM
[JVD] : no jugular venous distention  [Normal Breath Sounds] : Normal breath sounds [Normal Heart Sounds] : normal heart sounds [No Rash or Lesion] : No rash or lesion [Alert] : alert [Calm] : calm [de-identified] :  in no acute distress. Well- developed, well-nourished. [de-identified] : normocephalic [de-identified] : soft, non-distended, non-tender, no rebound or guarding. +moderate size umbilical hernia  [de-identified] : deferred

## 2025-05-19 NOTE — PLAN
[FreeTextEntry1] : Anterior abdominal wall hernia, symptomatic  - F/u standard pre-op labs and EKG  - Obtain PCP clearance  - CT A/P w/ contrast reviewed with the pt, needs disc.  - Plan for robotic assisted, possible open, ___ inguinal /abdominal / ventral / incisional / umbilical hernia repair with mesh  - Go to ED if pain is resistant to pain medication or if symptoms worsen (N/V/fever, chills).   Render Risk Assessment In Note?: no

## 2025-05-19 NOTE — ASSESSMENT
[FreeTextEntry1] : This is a 53 y/o Male presents today for initial evaluation of ventral hernia.   The pt with a PMHx of hepatic encephalopathy, PVT and PSHx of ........ Patient reports worsening of discomfort over the last.......weeks/month in abdomen exacerbated by coughing, straining, sneezing, lifting. Patient reports expansion of bulging/swelling that is reducible and associated with intermittent non-radiating pain. Relief with self-reduction/low activity/reclining. Denies nausea, vomiting, fever, chills, pain out of proportion, chest pain, HA, dizziness. Denies constipation, difficulty with bowel movements or urination. Denies shortness of breath, ROWE, or difficulty breathing, patient can walk / climb 2 flights without stopping. Denies hx of blood clots or bleeding problems.   Imaging: CT A/P w/ contrast (11/12/24): Increased size of anterior abdominal wall hernias containing fluid/ascites.  Grossly stable large volume ascites   Exam significant for:  + ??incisional/ parastomal/ spigelian hernia, reducible.     The nature and risks of hernia were discussed as were signs and symptoms of incarceration, obstruction and strangulation as possible risks of observation. A thorough preoperative education has been performed about the role and the different surgical options have been discussed with patient. Risks, benefits and alternatives have been discussed and patient verbalizes understanding. Laparoscopic/Robotic/Open repair of inguinal hernia/abdominal hernia was discussed with the patient at length. The risks, benefits, and alternatives of the types of repair as well as to the use of mesh were discussed and all questions answered. Risks of hernia repair include, but are not limited to infection, bleeding, recurrence and injury to adjacent structures and nerves. Advised patient about possible risk of future complications if hernia worsens or goes untreated. Patient would like to proceed with RA ventral repair with mesh.

## 2025-05-19 NOTE — PLAN
[FreeTextEntry1] : Anterior abdominal wall hernia, symptomatic  - F/u standard pre-op labs and EKG  - Obtain PCP clearance  - CT A/P w/ contrast reviewed with the pt, needs disc.    - Plan for robotic assisted, possible open, ___  inguinal /abdominal / ventral / incisional / umbilical hernia repair with mesh   - Go to ED if pain is resistant to pain medication or if symptoms worsen (N/V/fever, chills)

## 2025-05-19 NOTE — REASON FOR VISIT
Detail Level: Detailed [Initial Evaluation] : an initial evaluation [FreeTextEntry1] : umbilical hernia

## 2025-05-19 NOTE — PHYSICAL EXAM
[Normal Breath Sounds] : Normal breath sounds [Normal Heart Sounds] : normal heart sounds [No Rash or Lesion] : No rash or lesion [Alert] : alert [Calm] : calm [JVD] : no jugular venous distention  [de-identified] :  in no acute distress. Well- developed, well-nourished. [de-identified] : normocephalic [de-identified] : soft, non-distended, non-tender, no rebound or guarding. +moderate size umbilical hernia  [de-identified] : deferred

## 2025-05-19 NOTE — PHYSICAL EXAM
[Normal Breath Sounds] : Normal breath sounds [Normal Heart Sounds] : normal heart sounds [No Rash or Lesion] : No rash or lesion [Alert] : alert [Calm] : calm [JVD] : no jugular venous distention  [de-identified] :  in no acute distress. Well- developed, well-nourished. [de-identified] : normocephalic [de-identified] : soft, non-distended, non-tender, no rebound or guarding. +moderate size umbilical hernia  [de-identified] : deferred

## 2025-06-02 NOTE — PLAN
[FreeTextEntry1] : Recurrent umbilical hernia, symptomatic:  - F/u standard pre-op labs and EKG  - Obtain PCP and hepatologist clearance (hx of hepatic encephalopathy).   - CT A/P w/ contrast reviewed with the pt disc on file  - Plan for robotic assisted, possible open, recurrent umbilical hernia repair with mesh (will need to leave drain for ascites drainage).   - Go to ED if pain is resistant to pain medication or if symptoms worsen (N/V/fever, chills).

## 2025-06-02 NOTE — HISTORY OF PRESENT ILLNESS
[de-identified] : This is a 53 y/o Male presents today for initial evaluation of recurrent umbilical hernia.  The pt with a PMHx of hepatic encephalopathy, PVT and PSHx of umbilical hernia repair (2024). The pt reports recurrences of umbilical hernia 6 months after previous repair, with pain and increased bulge. The left spigelian hernia hurts at time. He reports getting paracentesis every 2-4 weeks, managed by Dr. Ruiz.  Relief with self-reduction/low activity/reclining. Denies nausea, vomiting, fever, chills, pain out of proportion, chest pain, HA, dizziness. Denies constipation, difficulty with bowel movements or urination. Denies shortness of breath, ROWE, or difficulty breathing, patient can walk / climb 2 flights without stopping. Denies hx of blood clots or bleeding problems.  Imaging: CT 04/25: Large adjacent fluid filled recurrent ventral hernias despite prior mesh repair. Dominant left abdominal hernia approximately measures 12 x 6 cm. Two adjacent right abdominal hernias, larger measures 7 x 5 cm. Additional small fluid filled umbilical hernia. Small fluid-filled left obturator internus hernia. CT A/P w/ contrast (11/12/24): Increased size of anterior abdominal wall hernias containing fluid/ascites. Grossly stable large volume ascites.

## 2025-06-02 NOTE — PHYSICAL EXAM
[Normal Breath Sounds] : Normal breath sounds [Normal Heart Sounds] : normal heart sounds [No Rash or Lesion] : No rash or lesion [Alert] : alert [Calm] : calm [JVD] : no jugular venous distention  [de-identified] :  in no acute distress. Well- developed, well-nourished. [de-identified] : normocephalic [de-identified] : soft, non-distended, non-tender, no rebound or guarding. +moderate size umbilical hernia  [de-identified] : deferred

## 2025-06-02 NOTE — PHYSICAL EXAM
[Normal Breath Sounds] : Normal breath sounds [Normal Heart Sounds] : normal heart sounds [No Rash or Lesion] : No rash or lesion [Alert] : alert [Calm] : calm [JVD] : no jugular venous distention  [de-identified] :  in no acute distress. Well- developed, well-nourished. [de-identified] : normocephalic [de-identified] : soft, non-distended, non-tender, no rebound or guarding. +moderate size umbilical hernia  [de-identified] : deferred

## 2025-06-02 NOTE — ASSESSMENT
[FreeTextEntry1] : This is a 55 y/o Male presents today for initial evaluation of recurrent umbilical hernia.  The pt with a PMHx of hepatic encephalopathy, PVT and PSHx of umbilical hernia repair (2024). The pt reports recurrences of umbilical hernia 6 months after previous repair, with pain and increased bulge. The left spigelian hernia hurts at time. He reports getting paracentesis every 2-4 weeks, managed by Dr. Ruiz.  Relief with self-reduction/low activity/reclining. Denies nausea, vomiting, fever, chills, pain out of proportion, chest pain, HA, dizziness. Denies constipation, difficulty with bowel movements or urination. Denies shortness of breath, ROWE, or difficulty breathing, patient can walk / climb 2 flights without stopping. Denies hx of blood clots or bleeding problems.  Imaging: CT 04/25: Large adjacent fluid filled recurrent ventral hernias despite prior mesh repair. Dominant left abdominal hernia approximately measures 12 x 6 cm. Two adjacent right abdominal hernias, larger measures 7 x 5 cm. Additional small fluid filled umbilical hernia. Small fluid-filled left obturator internus hernia. CT A/P w/ contrast (11/12/24): Increased size of anterior abdominal wall hernias containing fluid/ascites. Grossly stable large volume ascites.  Exam significant for: +moderate size umbilical hernia   The nature and risks of hernia were discussed as were signs and symptoms of incarceration, obstruction and strangulation as possible risks of observation. A thorough preoperative education has been performed about the role and the different surgical options have been discussed with patient. Risks, benefits and alternatives have been discussed and patient verbalizes understanding. Laparoscopic/Robotic/Open repair of inguinal hernia/abdominal hernia was discussed with the patient at length. The risks, benefits, and alternatives of the types of repair as well as to the use of mesh were discussed and all questions answered. Risks of hernia repair include, but are not limited to infection, bleeding, recurrence and injury to adjacent structures and nerves. Advised patient about possible risk of future complications if hernia worsens or goes untreated. Patient would like to proceed with RA recurrent umbilical repair with mesh.

## 2025-06-02 NOTE — PHYSICAL EXAM
[Normal Breath Sounds] : Normal breath sounds [Normal Heart Sounds] : normal heart sounds [No Rash or Lesion] : No rash or lesion [Alert] : alert [Calm] : calm [JVD] : no jugular venous distention  [de-identified] :  in no acute distress. Well- developed, well-nourished. [de-identified] : normocephalic [de-identified] : soft, non-distended, non-tender, no rebound or guarding. +moderate size umbilical hernia  [de-identified] : deferred

## 2025-06-02 NOTE — ASSESSMENT
[FreeTextEntry1] : This is a 53 y/o Male presents today for initial evaluation of recurrent umbilical hernia.  The pt with a PMHx of hepatic encephalopathy, PVT and PSHx of umbilical hernia repair (2024). The pt reports recurrences of umbilical hernia 6 months after previous repair, with pain and increased bulge. The left spigelian hernia hurts at time. He reports getting paracentesis every 2-4 weeks, managed by Dr. Ruiz.  Relief with self-reduction/low activity/reclining. Denies nausea, vomiting, fever, chills, pain out of proportion, chest pain, HA, dizziness. Denies constipation, difficulty with bowel movements or urination. Denies shortness of breath, ROWE, or difficulty breathing, patient can walk / climb 2 flights without stopping. Denies hx of blood clots or bleeding problems.  Imaging: CT 04/25: Large adjacent fluid filled recurrent ventral hernias despite prior mesh repair. Dominant left abdominal hernia approximately measures 12 x 6 cm. Two adjacent right abdominal hernias, larger measures 7 x 5 cm. Additional small fluid filled umbilical hernia. Small fluid-filled left obturator internus hernia. CT A/P w/ contrast (11/12/24): Increased size of anterior abdominal wall hernias containing fluid/ascites. Grossly stable large volume ascites.  Exam significant for: +moderate size umbilical hernia   The nature and risks of hernia were discussed as were signs and symptoms of incarceration, obstruction and strangulation as possible risks of observation. A thorough preoperative education has been performed about the role and the different surgical options have been discussed with patient. Risks, benefits and alternatives have been discussed and patient verbalizes understanding. Laparoscopic/Robotic/Open repair of inguinal hernia/abdominal hernia was discussed with the patient at length. The risks, benefits, and alternatives of the types of repair as well as to the use of mesh were discussed and all questions answered. Risks of hernia repair include, but are not limited to infection, bleeding, recurrence and injury to adjacent structures and nerves. Advised patient about possible risk of future complications if hernia worsens or goes untreated. Patient would like to proceed with RA recurrent umbilical repair with mesh.

## 2025-06-02 NOTE — HISTORY OF PRESENT ILLNESS
[de-identified] : This is a 53 y/o Male presents today for initial evaluation of recurrent umbilical hernia.  The pt with a PMHx of hepatic encephalopathy, PVT and PSHx of umbilical hernia repair (2024). The pt reports recurrences of umbilical hernia 6 months after previous repair, with pain and increased bulge. The left spigelian hernia hurts at time. He reports getting paracentesis every 2-4 weeks, managed by Dr. Ruiz.  Relief with self-reduction/low activity/reclining. Denies nausea, vomiting, fever, chills, pain out of proportion, chest pain, HA, dizziness. Denies constipation, difficulty with bowel movements or urination. Denies shortness of breath, ROWE, or difficulty breathing, patient can walk / climb 2 flights without stopping. Denies hx of blood clots or bleeding problems.  Imaging: CT 04/25: Large adjacent fluid filled recurrent ventral hernias despite prior mesh repair. Dominant left abdominal hernia approximately measures 12 x 6 cm. Two adjacent right abdominal hernias, larger measures 7 x 5 cm. Additional small fluid filled umbilical hernia. Small fluid-filled left obturator internus hernia. CT A/P w/ contrast (11/12/24): Increased size of anterior abdominal wall hernias containing fluid/ascites. Grossly stable large volume ascites.

## 2025-06-02 NOTE — ASSESSMENT
[FreeTextEntry1] : This is a 53 y/o Male presents today for initial evaluation of recurrent umbilical hernia.  The pt with a PMHx of hepatic encephalopathy, PVT and PSHx of umbilical hernia repair (2024). The pt reports recurrences of umbilical hernia 6 months after previous repair, with pain and increased bulge. The left spigelian hernia hurts at time. He reports getting paracentesis every 2-4 weeks, managed by Dr. Ruiz.  Relief with self-reduction/low activity/reclining. Denies nausea, vomiting, fever, chills, pain out of proportion, chest pain, HA, dizziness. Denies constipation, difficulty with bowel movements or urination. Denies shortness of breath, ORWE, or difficulty breathing, patient can walk / climb 2 flights without stopping. Denies hx of blood clots or bleeding problems.  Imaging: CT 04/25: Large adjacent fluid filled recurrent ventral hernias despite prior mesh repair. Dominant left abdominal hernia approximately measures 12 x 6 cm. Two adjacent right abdominal hernias, larger measures 7 x 5 cm. Additional small fluid filled umbilical hernia. Small fluid-filled left obturator internus hernia. CT A/P w/ contrast (11/12/24): Increased size of anterior abdominal wall hernias containing fluid/ascites. Grossly stable large volume ascites.  Exam significant for: +moderate size umbilical hernia   The nature and risks of hernia were discussed as were signs and symptoms of incarceration, obstruction and strangulation as possible risks of observation. A thorough preoperative education has been performed about the role and the different surgical options have been discussed with patient. Risks, benefits and alternatives have been discussed and patient verbalizes understanding. Laparoscopic/Robotic/Open repair of inguinal hernia/abdominal hernia was discussed with the patient at length. The risks, benefits, and alternatives of the types of repair as well as to the use of mesh were discussed and all questions answered. Risks of hernia repair include, but are not limited to infection, bleeding, recurrence and injury to adjacent structures and nerves. Advised patient about possible risk of future complications if hernia worsens or goes untreated. Patient would like to proceed with RA recurrent umbilical repair with mesh.

## 2025-06-02 NOTE — HISTORY OF PRESENT ILLNESS
[de-identified] : This is a 55 y/o Male presents today for initial evaluation of recurrent umbilical hernia.  The pt with a PMHx of hepatic encephalopathy, PVT and PSHx of umbilical hernia repair (2024). The pt reports recurrences of umbilical hernia 6 months after previous repair, with pain and increased bulge. The left spigelian hernia hurts at time. He reports getting paracentesis every 2-4 weeks, managed by Dr. Ruiz.  Relief with self-reduction/low activity/reclining. Denies nausea, vomiting, fever, chills, pain out of proportion, chest pain, HA, dizziness. Denies constipation, difficulty with bowel movements or urination. Denies shortness of breath, ROWE, or difficulty breathing, patient can walk / climb 2 flights without stopping. Denies hx of blood clots or bleeding problems.  Imaging: CT 04/25: Large adjacent fluid filled recurrent ventral hernias despite prior mesh repair. Dominant left abdominal hernia approximately measures 12 x 6 cm. Two adjacent right abdominal hernias, larger measures 7 x 5 cm. Additional small fluid filled umbilical hernia. Small fluid-filled left obturator internus hernia. CT A/P w/ contrast (11/12/24): Increased size of anterior abdominal wall hernias containing fluid/ascites. Grossly stable large volume ascites.

## 2025-06-18 NOTE — ASSESSMENT
[FreeTextEntry1] : 55yo male, pt of Dr. 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 Pt was under OLT eval 2024 but repeatedly deferred for psychosocial clearance (RPP attendance).  Currently scheduled for hernia repair 8/4/25 with Dr. Guzman Christensen.   Plan  - F/u w/ Dr. Ruiz as scheduled

## 2025-06-18 NOTE — ASSESSMENT
[FreeTextEntry1] : 53yo male, pt of Dr. 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 Pt was under OLT eval 2024 but repeatedly deferred for psychosocial clearance (RPP attendance).  Currently scheduled for hernia repair 8/4/25 with Dr. Guzman Christensen.   Plan  - F/u w/ Dr. Ruiz as scheduled

## 2025-06-18 NOTE — HISTORY OF PRESENT ILLNESS
[de-identified] : 55yo male, pt of Dr. 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 Pt was under OLT eval 2024 but repeatedly deferred for psychosocial clearance (RPP attendance)  [de-identified] : BACKGROUND:  Pt w/ relevant hx of [facility] admission 4/9-4/18/24 for further mgmt after being transferred from [facility].  4/5/24: Presented to [facility] ED with abd pain, found to have incarcerated ventral hernia s/p repair with mesh on 4/5 and placement of CESILIA drain. CESILIA removed on 4/9. Transferred to [facility] for further management 4/9-4/18. Underwent repeat EGD 4/17/24 which showed esophageal candidiasis (started on nystatin suspension 4/17); he was also found to have extension of his chronic PVT, so he was started on Lovenox and transitioned to Eliquis at discharge. Seen by Dr. Ruiz for hepatology with good f/u.   Recently as of 4/21/25 visit, pt reported that his hernia has been more bothersome than usual and has not been reducible over the last couple days. On exam, patient was noted to have a large reducible ventral hernia in the LUQ, and a smaller central ventral hernia that was not reducible. Due to the patient's report of increased pain and the nonreducible nature of the the central ventral hernia, patient was sent to the ED for urgent imaging.  4/21/25 [facility] ED CTAP w/ IV/ po Contrast: IMPRESSION: Large adjacent fluid-filled ventral abdominal wall recurrent hernias and's small fluid-filled umbilical hernia. Cirrhosis with sequela of portal venous hypertension as described notably moderate volume abdominal pelvic ascites, consider paracentesis. 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 Recannulized periumbilical vein. No biliary ductal dilatation. Distended gallbladder which may be secondary to prolonged n.p.o. status. No gallbladder wall thickening. Splenomegaly (19 cm). Pancreas within normal limits. Lower esophageal, gastrohepatic, omental, and perigastric varices. Splenorenal varices but shunt. No lymphadenopathy.  Then seen by Dr. Guzman Christensen for surgical evaluation, scheduled for hernia repair 8/4/25 (was NOT cleared by Dr. Ruiz for this). However, during f/u visit w/ Dr. Ruiz 6/17/25,  Also scheduled for EGD/COL 6/19/25 w/ 2 days of moviprep and was instructed to hold Eliquis x2 days prior to procedure. Was with some med non compliance (missed 1-2 doses of diuretics per week), now on straight Medicaid, informed about the need to apply for secondary health insurance by Dr. Ruiz. Still using marijuana (smoking, edibles).  Had LVP at [facility] 6/16 -- had ~3L removed, also had LVP 2w prior (early 6/2025) - 2.5L removed. Had a session in May -- 3L removed.  - Current meds 6/17/25:  Eliquis 2.5 mg BID, Rifaximin 550 mg BID. Lasix 40 mg twice daily, Aldactone 100 mg daily. Cefpodoxime 200 mg daily. MVI, folic acid, thiamine daily. [STOPPED Midodrine 10mg TID in 5/2025]. - Other meds: [Bupropion 150 mg daily, mirtazapine 7.5 mg nightly -- ran out 1-2w ago]. Protonix 40 mg daily. Ferrous sulfate 325 mg daily. Calcium 600 mg daily. Multivitamin once daily, Aldactone 100 mg/d, Midodrine 10 mg TID, Mirtazapine 7.5 mg nightly, Cefpodoxime 200 mg/d, Ferrous sulfate 325 mg/d, Protonix 40 mg/d, Bupropion 150 mg/d, Folic acid 1 mg/d, Vitamin B-1 100 mg/d, Lasix 40 mg BID, Calcium 600 mg/d.  Latest labs 5/8/25: 12O

## 2025-06-18 NOTE — HISTORY OF PRESENT ILLNESS
[de-identified] : 55yo male, pt of Dr. 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 Pt was under OLT eval 2024 but repeatedly deferred for psychosocial clearance (RPP attendance)  [de-identified] : BACKGROUND:  Pt w/ relevant hx of [facility] admission 4/9-4/18/24 for further mgmt after being transferred from [facility].  4/5/24: Presented to [facility] ED with abd pain, found to have incarcerated ventral hernia s/p repair with mesh on 4/5 and placement of CESILIA drain. CESILIA removed on 4/9. Transferred to [facility] for further management 4/9-4/18. Underwent repeat EGD 4/17/24 which showed esophageal candidiasis (started on nystatin suspension 4/17); he was also found to have extension of his chronic PVT, so he was started on Lovenox and transitioned to Eliquis at discharge. Seen by Dr. Ruiz for hepatology with good f/u.   Recently as of 4/21/25 visit, pt reported that his hernia has been more bothersome than usual and has not been reducible over the last couple days. On exam, patient was noted to have a large reducible ventral hernia in the LUQ, and a smaller central ventral hernia that was not reducible. Due to the patient's report of increased pain and the nonreducible nature of the the central ventral hernia, patient was sent to the ED for urgent imaging.  4/21/25 [facility] ED CTAP w/ IV/ po Contrast: IMPRESSION: Large adjacent fluid-filled ventral abdominal wall recurrent hernias and's small fluid-filled umbilical hernia. Cirrhosis with sequela of portal venous hypertension as described notably moderate volume abdominal pelvic ascites, consider paracentesis. 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 Recannulized periumbilical vein. No biliary ductal dilatation. Distended gallbladder which may be secondary to prolonged n.p.o. status. No gallbladder wall thickening. Splenomegaly (19 cm). Pancreas within normal limits. Lower esophageal, gastrohepatic, omental, and perigastric varices. Splenorenal varices but shunt. No lymphadenopathy.  Then seen by Dr. Guzman Christensen for surgical evaluation, scheduled for hernia repair 8/4/25 (was NOT cleared by Dr. Ruiz for this). However, during f/u visit w/ Dr. Ruiz 6/17/25,  Also scheduled for EGD/COL 6/19/25 w/ 2 days of moviprep and was instructed to hold Eliquis x2 days prior to procedure. Was with some med non compliance (missed 1-2 doses of diuretics per week), now on straight Medicaid, informed about the need to apply for secondary health insurance by Dr. Ruiz. Still using marijuana (smoking, edibles).  Had LVP at [facility] 6/16 -- had ~3L removed, also had LVP 2w prior (early 6/2025) - 2.5L removed. Had a session in May -- 3L removed.  - Current meds 6/17/25:  Eliquis 2.5 mg BID, Rifaximin 550 mg BID. Lasix 40 mg twice daily, Aldactone 100 mg daily. Cefpodoxime 200 mg daily. MVI, folic acid, thiamine daily. [STOPPED Midodrine 10mg TID in 5/2025]. - Other meds: [Bupropion 150 mg daily, mirtazapine 7.5 mg nightly -- ran out 1-2w ago]. Protonix 40 mg daily. Ferrous sulfate 325 mg daily. Calcium 600 mg daily. Multivitamin once daily, Aldactone 100 mg/d, Midodrine 10 mg TID, Mirtazapine 7.5 mg nightly, Cefpodoxime 200 mg/d, Ferrous sulfate 325 mg/d, Protonix 40 mg/d, Bupropion 150 mg/d, Folic acid 1 mg/d, Vitamin B-1 100 mg/d, Lasix 40 mg BID, Calcium 600 mg/d.  Latest labs 5/8/25: 12O

## 2025-06-18 NOTE — PLAN
[FreeTextEntry1] : 54 year old gentleman with MASH cirrhosis, PVT with cavernous transformation, LVP every 2 weeks.  MELD in the teens, blood type O.  Currently in transplant evaluation.  Had an emergent umbilical hernia repair with mesh about a year ago.  Now has recurrence with multiple hernia sacs filled with ascites.  Is on diuretics but has had issues with taking them due to insurance, per the patient.  I advised him that we should optimize his diuretics and further discuss his transplant candidacy before proceeding repair.  I will also talk with his hepatologist and IR regarding the possibility of TIPS should the diuretics be ineffective.  He understands and agrees with the plan.   Good to see him.
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
## 2025-07-03 NOTE — ASSESSMENT
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 Last seen on 4/7/25.  - Due to ventral hernia being non-reducible and slightly tender for the last few days, instructed patient to go to [facility] ED for further imaging/evaluation.   - Discussed at liver selection meeting on 5/23/24 -- decision was made to DEFER pt at this time pending psychosocial clearance (RPP enrollment). Pt was counseled while inpatient on the need for RPP enrollment prior to OLT listing and he verbalized understanding.  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 Continue rifaximin 550mg BID.  - Ascites/Volume: Restarte lasic 80mg BID and  Aldactone 100mg daily. Advised to fluid restriction.  Continue low Na diet. Monitor Cr (baseline Cr 0.7-0.8s). Continue LVP PRN at [facility].  - Hx of SBP: Had E coli bacteremia 2/2 SBP (E coli resistant to Cipro/Bactrim) in 2/2024. Continue cefpodoxime 200mg daily for SBP ppx.  - Hx of EV bleed: EGD (4/17/24) => Diminutive EV; PHG; normal duodenum. Previously was on BB but this was stopped during [facility] admission in 4/2024; currently on midodrine for hypotension so will NOT resume BB (unless pt is able to be titrated off midodrine). Next EGD scheduled for 5/15/25.  - Hypotension (resolved): Instructed to stop Midodrine 10mg TID and monitor BP trend.  - HCC screening: CT A/P w/wo contrast 11/2024 without focal liver lesion. Repeat imaging, AFP q6mo (next due in 5/2025).  - Chronic PVT: CT A/P (4/2024) showed main portal venous occlusive thrombosis with extension into the visualized bilateral portal veins. Was started on Eliquis during [facility] admission in 4/2024; given recent fall, he was discharged on 5/25 OFF of AC. Eliquis restarted at lower dose of 2.5mg BID on 6/3/24 -- continue. No falls since last visit. CT Abd w/wo contrast (11/2024) showed unchanged chronic calcified PVT.  - Counseled pt to avoid hepatotoxins, alcohol use, herbal supplements. Limit acetaminophen to 2g/day. - Counseled pt on the importance of medication adherence, and to bring all his medication bottles in to every hepatology visit moving forward.  #Ventral Hernia: - I informed the pt that given his decompensated cirrhosis, hernia repair at this time would be high-risk (likely to have further decompensation/risk of death post-op; also is not yet listed for OLT so would not have a backup plan if he does poorly post-op).  - Due to ventral hernia being slightly tender and non-reducible, instructed patient to go to [facility] ED for CT A/P imaging and further evaluation.   #OLT candidacy: Eval ongoing. Discussed at liver selection meeting on 5/23/24 -- decision was made to DEFER pt at this time pending psychosocial clearance (RPP enrollment). - Follow up with Psych/SW for clearance. Enrolled in [employer] since Jan 2025. - Check PETH, UTox as per SW recs.  -- COL 5/24/24 (with 2d prep) => Hemorrhoids. Medium sized nonbleeding rectal varices. Mod amount of stool in entire colon interfering with visualization. 2mm sessile polyp in sigmoid colon removed (Path: Early sessile serrated polyp). Recommendations: Repeat colonoscopy for surveillance after transplant. No large masses found on this exam. Given that there was stool throughout the colon, could not rule out polyps < 6mm. Would recommend low residual diet and MiraLAX for 2 weeks as well as 2-day prep for next colonoscopy.

## 2025-07-03 NOTE — REASON FOR VISIT
[FreeTextEntry1] : 54 year old man active smoker, asthma, h/o IVDU with heroin, ?WYNN cirrhosis decompensated by HE, ascites and EV here for pre-operative cardiac evaluation, currently on liver transplant list. Patient accompanied by mother,  was discharged from [facility] yesterday after 10 day hospitalization for HE and hernia pain. He has been very fatigued and weak recently however previously was able to walk dogs for multiple blocks. Prior to most recent hospitalization, he walks half an hour without stopping still, denies resting or exertional chest pain or dyspnea. Denies lightheadedness, palpitations, orthopnea, PND or JIN. Remote episode of syncope 15 years ago thought due to dehydration after working in sun.   Since last visit, has been listed for liver transplant. He continues to walk dogs and climb two flights of stairs with some dyspnea, which patient feels is variable with ascites. Denies syncope, lightheadedness, chest pain, palpitations, orthopnea, PND or JIN. Continues to smoke 1 cigarette every other day, motivated to quit.   SH No EtOH 1 cigarette occasionally No stimulants Remote IVDU  FH [place] RHD with CHF Sister CVA 62 years old, DM2     TTE with bubble 2/15/2024  1. Normal left and right ventricular size and systolic function.  2. Mild symmetric left ventricular hypertrophy.  3. No significant valvular disease.  4. No evidence of pulmonary hypertension.  5. No pericardial effusion.  6. Injection of agitated saline via a peripheral vein reveals bubbles in the left heart within 3 heart beats, most consistent with a small patent foramen ovale.  Dobutamine Stress TTE 2/23/2024  1. The echocardiogram is non-diagnostic due to inadequate heart rate and systolic BP product achieved however, no ischemic changes seen at 74 % of maximum heart rate achieved.  2. Baseline left ventricular cavity size was normal. Immediate post-stress, the left ventricular cavity size was decreased in size.  3. Normal left ventricular segmental wall motion and systolic function at rest. hyperdynamic response in left ventricular systolic function immediate post-stress.  4. At rest there were no left ventricular regional wall motion abnormalities. Immediate post-stress there were no regional wall motion abnormalities seen.  5. No significant stress-induced arrhythmias.  6. Did not achieve target heart rate.  7. Normal left and right ventricular cavity size, wall thickness, and systolic function.  8. No prior echocardiogram is available for comparison.  9. Non diagnostic for ischemia due to failure to reach heart rate target. 10. Left ventricular systolic function is hyperdynamic with an ejection fraction visually estimated at 70 to 75 %. 11. Normal right ventricular cavity size, with wall thickness, and normal systolic function. 12. No pericardial effusion seen.

## 2025-07-03 NOTE — PHYSICAL EXAM
[No Acute Distress] : no acute distress [Normal Conjunctiva] : normal conjunctiva [Normal Venous Pressure] : normal venous pressure [No Carotid Bruit] : no carotid bruit [Normal S1, S2] : normal S1, S2 [No Murmur] : no murmur [No Rub] : no rub [No Gallop] : no gallop [Clear Lung Fields] : clear lung fields [Soft] : abdomen soft [Non Tender] : non-tender [No Edema] : no edema [de-identified] : Distended [de-identified] : No asterixis

## 2025-07-03 NOTE — ASSESSMENT
[FreeTextEntry1] : 54 year old man active smoker, asthma, h/o IVDU with heroin, ?WYNN cirrhosis decompensated by HE, ascites and EV, chronic portal vein thrombosis on dose adjusted Eliquis here for pre-operative cardiac evaluation, currently on liver transplant listing. No cardiac symptoms however minimally active. LDL 32, Ac1 5.1. Cr 0.7 on 3/27/2024. Euvolemic on exam, EKG normal. C 5/21/2024 angiographically normal coronaries.  - Check A1c and lipids  - TTE, CCTA  - Dobutamine stress echo 2/24/2024 was non-diagnostic, no ischemia at 74% MPHR - TTE 2024 with mild LVH normal biV function, normal valves, +PFO - On lactulose, rifaximin, cipro, zinc, pantoprazole, propranolol 10 BID per Hepatology

## 2025-07-15 NOTE — HISTORY OF PRESENT ILLNESS
[FreeTextEntry1] : Mr. 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 who presents to IR for consultation for TIPS.   Patient with hx as above. He is currently listed for liver transplant. He has a hx of multiple incarcerated hernia's s/p repair, pending hernia repair however not cleared from hepatology yet?           He is referred to IR for TIPS consultation by Dr. Ruiz.    Pt with hx of ascites, currently on lasix 80mg BID and aldactone 100mg daily, requiring paracentesis q 2 weeks?    He also has a hx of EV Bleed in 4/2024. Had recent EGD 6/19/25-Grade 1 esophageal varices were noted, scarring from previous banding was noted, also with mild portal hypertensive gastropathy.  HE?  Hep: Korin Ruiz Cards: Patricia Quinones  Current medications: Eliquis 2.5 mg BID Rifaximin 550 mg BID Lasix 80 mg twice daily Aldactone 100 mg daily Cefpodoxime 200 mg daily folic acid  thiamine Bupropion 150 mg daily mirtazapine 7.5 mg nightly Protonix 40 mg daily Ferrous sulfate 325 mg daily Calcium 600 mg daily. Multivitamin once daily

## 2025-07-15 NOTE — ASSESSMENT
[Other: _____] : [unfilled] [FreeTextEntry1] : Mr. 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 who presents to IR for consultation for TIPS.  1. Decompensated Cirrhosis c/b ascites and EV - Pt with decompensated cirrhosis with recurrent ascites refractory to medical therapy and esophageal varices - 6/25/25 CT reviewed and discussed with pt  - Discussed potential benefits including: (1) prevention of recurrent variceal hemorrhage; (2) decreased ascites (and thereby prevention of recurrent SBP); (3) improved portal vein patency and hepatic inflow to preserve the possibility for single organ liver transplantation in the future, as opposed to multivisceral transplantation which has poorer outcomes; and (4) possible decongestion of his splenic vein leading to decreased splenomegaly and improved blood counts. - Discussed some potential risks of TIPS, including: (1) technical failure of the procedure; (2) procedural complications such as bleeding; (3) hepatic encephalopathy; (4)pulmonary edema or RV failure post-TIPS, and (5) liver failure due to ischemic liver injury.  - TTE- no recent echo-ordered by cardiology Dr. Patricia Quinones along with CTCA both scheduled for 8/11 - Portal vein evaluation- chronic PVT on eliquis-reviewed on 6/25/25 CT - MELD: 11 (7/2-7/3 labs)   - given that the patient needs frequent LVP and has limited ability to titrate her diuretic regimen due to her renal function, TIPSS could be an option that would improve her quality of life  - will need to discuss holding eliquis 72 hours preop with Dr. Ruiz  - will make arrangements - continue with consistent f/u with Dr. Ruiz  2. PFO - TTE 2024 with mild LVH normal biV function, normal valves, +PFO - managed by Dr. Patricia Quinones - scheduled for TTE and CTCA 8/11 - will require cardiology clearance from Dr. Quinones  I have provided the patient the opportunity to ask questions and have answered them to their satisfaction.  They are encouraged to contact our office with any further questions, concerns, or issues.

## 2025-07-15 NOTE — REASON FOR VISIT
[Consultation] : a consultation visit [Home] : at home, [unfilled] , at the time of the visit. [Medical Office: ([facility])___] : at the medical office located in  [Telehealth (audio & video)] : This visit was provided via telehealth using real-time 2-way audio visual technology. [Verbal consent obtained from patient] : the patient, [unfilled] [FreeTextEntry1] : TIPS

## 2025-07-15 NOTE — HISTORY OF PRESENT ILLNESS
[FreeTextEntry1] : Mr. 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 who presents to IR for consultation for TIPS.   Patient with hx as above. He is currently listed for liver transplant. He has a hx of multiple incarcerated hernia's s/p repair, pending hernia repair however not cleared from hepatology yet?           He is referred to IR for TIPS consultation by Dr. Ruiz.    Pt with hx of ascites, currently on lasix 80mg BID and aldactone 100mg daily, requiring paracentesis q 2 weeks?    He also has a hx of EV Bleed in 4/2024. Had recent EGD 6/19/25-Grade 1 esophageal varices were noted, scarring from previous banding was noted, also with mild portal hypertensive gastropathy.  HE?  Hep: Korin Ruiz Cards: Patricia Quinones  Current medications: Eliquis 2.5 mg BID Rifaximin 550 mg BID Lasix 80 mg twice daily Aldactone 100 mg daily Cefpodoxime 200 mg daily folic acid  thiamine Bupropion 150 mg daily mirtazapine 7.5 mg nightly Protonix 40 mg daily Ferrous sulfate 325 mg daily Calcium 600 mg daily. Multivitamin once daily

## 2025-07-15 NOTE — ASSESSMENT
[Other: _____] : [unfilled] [FreeTextEntry1] : Mr. 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 who presents to IR for consultation for TIPS.  1. Decompensated Cirrhosis c/b ascites and EV - Pt with decompensated cirrhosis with recurrent ascites refractory to medical therapy and esophageal varices - 6/25/25 CT reviewed and discussed with pt  - Discussed potential benefits including: (1) prevention of recurrent variceal hemorrhage; (2) decreased ascites (and thereby prevention of recurrent SBP); (3) improved portal vein patency and hepatic inflow to preserve the possibility for single organ liver transplantation in the future, as opposed to multivisceral transplantation which has poorer outcomes; and (4) possible decongestion of his splenic vein leading to decreased splenomegaly and improved blood counts. - Discussed some potential risks of TIPS, including: (1) technical failure of the procedure; (2) procedural complications such as bleeding; (3) hepatic encephalopathy; (4)pulmonary edema or RV failure post-TIPS, and (5) liver failure due to ischemic liver injury.  - TTE- no recent echo-ordered by cardiology Dr. Patricia Quinones along with CTCA both scheduled for 8/11 - Portal vein evaluation- chronic PVT on eliquis-reviewed on 6/25/25 CT - MELD: 11 (7/2-7/3 labs)   - given that the patient needs frequent LVP and has limited ability to titrate her diuretic regimen due to her renal function, TIPSS could be an option that would improve her quality of life  - will need to discuss holding eliquis 72 hours preop with Dr. Ruiz  - will make arrangements - continue with consistent f/u with Dr. Ruiz  2. PFO - TTE 2024 with mild LVH normal biV function, normal valves, +PFO - managed by Dr. Patricia Quinones - scheduled for TTE and CTCA 8/11 - will require cardiology clearance from Dr. Quinones  I have provided the patient the opportunity to ask questions and have answered them to their satisfaction.  They are encouraged to contact our office with any further questions, concerns, or issues.

## 2025-07-15 NOTE — REASON FOR VISIT
[Consultation] : a consultation visit [Home] : at home, [unfilled] , at the time of the visit. [Medical Office: ([facility])___] : at the medical office located in  [Telehealth (audio & video)] : This visit was provided via telehealth using real-time 2-way audio visual technology. [Verbal consent obtained from patient] : the patient, [unfilled] [FreeTextEntry1] : TIPS

## 2025-07-21 NOTE — ASSESSMENT
[FreeTextEntry1] : Mr. 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 who presents to IR for consultation for TIPS.  1. Decompensated Cirrhosis c/b ascites and EV - Pt with decompensated cirrhosis with recurrent ascites refractory to medical therapy and prior hx of EV bleed - 6/25/25 CT reviewed and discussed with pt > Stable appearance of cirrhotic liver. No evidence of neoplasm. 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 - Discussed some potential risks of TIPS, including: (1) technical failure of the procedure; (2) procedural complications such as bleeding; (3) hepatic encephalopathy; (4)pulmonary edema or RV failure post-TIPS, and (5) liver failure due to ischemic liver injury. - 2/15/24 TTE: small PFO noted - Portal vein evaluation- chronic PVT on eliquis-reviewed on 6/25/25 CT - MELD: 11 (7/2-7/3 labs) - Given patient's hx of PVT and PFO he is a very high risk candidate for TIPS, especially for potential stroke - Additionally, given prior HE episodes patient is likely to become further encephalopathic - TIPS placement is anatomically not feasible due to small liver size, portal venous collateral and no true intrahepatic portal vein - given variceal bleeding is controlled at this time would defer TIPS placement due to above reasons - will discuss above with Dr. Ruiz - continue with consistent f/u with Dr. Ruiz  If moving forward with interventin:  2. PFO - TTE 2024 with mild LVH normal biV function, normal valves, +PFO - managed by Dr. Patricia Quinones - scheduled for TTE and CTCA 8/11 - will require cardiology clearance from Dr. Quinones  I have provided the patient the opportunity to ask questions and have answered them to their satisfaction.  They are encouraged to contact our office with any further questions, concerns, or issues.

## 2025-07-21 NOTE — ASSESSMENT
[FreeTextEntry1] : Mr. 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 who presents to IR for consultation for TIPS.  1. Decompensated Cirrhosis c/b ascites and EV - Pt with decompensated cirrhosis with recurrent ascites refractory to medical therapy and prior hx of EV bleed - 6/25/25 CT reviewed and discussed with pt > Stable appearance of cirrhotic liver. No evidence of neoplasm. 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 - Discussed some potential risks of TIPS, including: (1) technical failure of the procedure; (2) procedural complications such as bleeding; (3) hepatic encephalopathy; (4)pulmonary edema or RV failure post-TIPS, and (5) liver failure due to ischemic liver injury. - 2/15/24 TTE: small PFO noted - Portal vein evaluation- chronic PVT on eliquis-reviewed on 6/25/25 CT - MELD: 11 (7/2-7/3 labs) - Given patient's hx of PVT and PFO he is a very high risk candidate for TIPS, especially for potential stroke - Additionally, given prior HE episodes patient is likely to become further encephalopathic - TIPS placement is anatomically not feasible due to small liver size, portal venous collateral and no true intrahepatic portal vein - given variceal bleeding is controlled at this time would defer TIPS placement due to above reasons - will discuss above with Dr. Ruiz - continue with consistent f/u with Dr. Ruiz  If moving forward with interventin:  2. PFO - TTE 2024 with mild LVH normal biV function, normal valves, +PFO - managed by Dr. Patricia Quinones - scheduled for TTE and CTCA 8/11 - will require cardiology clearance from Dr. Quinones  I have provided the patient the opportunity to ask questions and have answered them to their satisfaction.  They are encouraged to contact our office with any further questions, concerns, or issues.

## 2025-07-21 NOTE — PHYSICAL EXAM
Urology  Resident Progress Note    SUBJECTIVE: Able to wean pressors with several boluses yesterday. BRENT creatinine noted to be elevated, likely representing anastomotic leak.     OBJECTIVE    Physical    VITALS:    Visit Vitals  BP 95/51   Pulse (!) 127   Temp 99.5 °F (37.5 °C) (Esophageal)   Resp (!) 0   Ht 5' 9.02\" (1.753 m)   Wt 104.6 kg (230 lb 9.6 oz)   SpO2 100%   BMI 34.04 kg/m²     INTAKE/OUTPUT:      Intake/Output Summary (Last 24 hours) at 4/28/2021 0711  Last data filed at 4/28/2021 0459  Gross per 24 hour   Intake 6902.12 ml   Output 2665 ml   Net 4237.12 ml     Gen: intubated, sedated   Lungs: ventilated breathing   Abd: Soft, distended, 4 drains in each quadrant, RLQ drain with noticeably more drainage than rest, RLQ ostomy without scant bloody drainage   : duran catheter in place with clear darker yellow urine     Data  CBC:   WBC (K/mcL)   Date Value   04/28/2021 12.4 (H)     RBC (mil/mcL)   Date Value   04/28/2021 3.97 (L)     HGB (g/dL)   Date Value   04/28/2021 11.6 (L)     HCT (%)   Date Value   04/28/2021 35.3 (L)     MCV (fl)   Date Value   04/28/2021 88.9     PLT (K/mcL)   Date Value   04/28/2021 87 (L)     BMP:    Sodium (mmol/L)   Date Value   04/28/2021 144     Potassium (mmol/L)   Date Value   04/28/2021 4.0     Chloride (mmol/L)   Date Value   04/28/2021 110 (H)     Glucose (mg/dL)   Date Value   04/28/2021 100 (H)     Calcium (mg/dL)   Date Value   04/28/2021 7.4 (L)     Carbon Dioxide (mmol/L)   Date Value   04/28/2021 25     BUN (mg/dL)   Date Value   04/28/2021 26 (H)     Creatinine (mg/dL)   Date Value   04/28/2021 1.84 (H)       Current Inpatient Medications    Current Facility-Administered Medications   Medication Dose Route Frequency Provider Last Rate Last Admin   • nitroGLYcerin topical (NITRO-BID) 2 % ointment 1 inch  1 inch Topical 3 times per day Min Mcpherson MD   1 inch at 04/28/21 0624   • heparin (porcine) injection 5,000 Units  5,000 Units Subcutaneous 3 times per  day Keshawn Damon MD   5,000 Units at 04/28/21 0623   • famotidine (PEPCID) injection 20 mg  20 mg Intravenous Daily Keshawn Damon MD       • MIDAZolam (VERSED) 100 mg/100 mL in saline infusion  0-4 mg/hr Intravenous Continuous Keshawn Damon MD 4 mL/hr at 04/28/21 0412 4 mg/hr at 04/28/21 0412   • meropenem (MERREM) 1 g in sodium chloride 0.9 % 100 mL IVPB  1 g Intravenous Q12H Keshawn Damon MD   Stopped at 04/28/21 0245   • fluconazole (DIFLUCAN) IVPB 200 mg  200 mg Intravenous Daily Keshawn Damon MD   Stopped at 04/27/21 1324   • dextrose 50 % injection 50 mL  50 mL Intravenous PRN Rhonda Gomes MD   50 mL at 04/28/21 0236   • dextrose 5 % infusion   Intravenous Continuous Rhonda Gomes  mL/hr at 04/28/21 0359 Rate Verify at 04/28/21 0359   • sodium chloride 0.9 % flush bag 25 mL  25 mL Intravenous PRN Victorina Milligan CNP       • lidocaine 2% urethral (UROJET) 2 % jelly 10 mL  10 mL Transurethral PRN Victorina Milligan CNP       • acetaminophen (TYLENOL) tablet 650 mg  650 mg Oral Q4H PRN Victorina Milligan CNP       • ondansetron (ZOFRAN ODT) disintegrating tablet 4 mg  4 mg Oral Q12H PRN Victorina Milligan CNP        Or   • ondansetron (ZOFRAN) injection 4 mg  4 mg Intravenous Q12H PRN Victorina Milligan CNP       • polyethylene glycol (MIRALAX) packet 17 g  17 g Oral Daily PRN Victorina Milligan CNP       • chlorhexidine gluconate (PERIDEX) 0.12 % solution 15 mL  15 mL [facility] [employer] [facility] Q12H Chinelo Ogbudinkpa, MD   15 mL at 04/27/21 2110   • sodium chloride 0.9 % flush bag 25 mL  25 mL Intravenous PRN Chinelo Ogbudinkpa, MD       • lactated ringers infusion   Intravenous Continuous Rhonda Gomes  mL/hr at 04/27/21 2148 Rate Change at 04/27/21 2148   • sodium chloride 0.9 % flush bag 1,000 mL  1,000 mL Intravenous PRN Min Mcpherson MD       • chlorhexidine gluconate (PERIDEX) 0.12 % solution 15 mL  15 mL Swish [employer] Spit On Call to Procedure Min Mcpherson MD       • NORepinephrine (LEVOPHED) 8 mg/250 mL in sodium  chloride 0.9 % infusion  0-30 mcg/min Intravenous Continuous Min Mcpherson MD 9.38 mL/hr at 04/28/21 0359 5 mcg/min at 04/28/21 0359   • vasopressin (PITRESSIN) 20 unit/100 mL in sodium chloride 0.9 % infusion  0.03-0.04 Units/min Intravenous Continuous Min Mcpherson MD 12 mL/hr at 04/28/21 0359 0.04 Units/min at 04/28/21 0359   • fentaNYL (SUBLIMAZE) 2,500 mcg/250 mL in sodium chloride 0.9 % infusion  0-150 mcg/hr Intravenous Continuous Min Mcpherson MD 5 mL/hr at 04/28/21 0359 50 mcg/hr at 04/28/21 0359   • propofol (DIPRIVAN) infusion  0-50 mcg/kg/min (Dosing Weight) Intravenous Continuous Min Mcpherson MD   Stopped at 04/27/21 0913   • PHENYLephrine (OMNIKA-SYNEPHRINE) 50 mg/250 mL in sodium chloride 0.9 % infusion  0-100 mcg/min Intravenous Continuous Min Mcpherson MD   Stopped at 04/27/21 1300   • sodium chloride 0.9 % flush bag 25 mL  25 mL Intravenous PRN Min Mcpherson MD       • Magnesium Standard Replacement Protocol   Does not apply See Admin Instructions Min Mcpherson MD           ASSESSMENT AND PLAN: Patient is a 56 year old male with a PMH of HTn, HLD, prostate cancer s/p radical prostatectomy on 4/22/2021 who presented with abdominal pain, found to have rectal perforation, taken back for washout and diverting loop ileostomy creation POD2.     - Pressor requirement improvement, appreciated continued SICU management   - Noted urine leak, continue drainage with RLQ BRENT and duran catheter   - Will follow   - D/w Dr. Santana Nash MD  Urology Resident PGY-4  Contact via [employer]- Urology Resident [facility]  Pt with signif urine leak  Loky from some disruption atexloration as no eviedence of leak on intial CT with rectal contrast and had no JPoutput initially post op  This should heal with time and adequate drainage  When assessing UO pleas einclude RLQ drain as part of output for now       [Restricted in physically strenuous activity but ambulatory and able to carry out work of a light or sedentary nature] : Restricted in physically strenuous activity but ambulatory and able to carry out work of a light or sedentary nature, e.g., light house work, office work

## 2025-07-21 NOTE — HISTORY OF PRESENT ILLNESS
[FreeTextEntry1] : Here with leonel.   Mr. 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 who presents to IR for consultation for TIPS.   Patient with hx as above. He is currently listed for liver transplant. He has a hx of multiple incarcerated hernia's s/p repair, pending hernia repair however not cleared from hepatology yet. He is referred to IR for TIPS consultation by Dr. Ruiz.   Pt with hx of ascites, currently on lasix 80mg BID and aldactone 100mg daily, requiring paracentesis q 2 weeks for the last 6 weeks. Previously he was requiring paracentesis q 3months for 2 years. He also has a hx of EV Bleed in 4/2024. No further bleeding. He had a recent EGD 6/19/25-Grade 1 esophageal varices were noted, scarring from previous banding was noted, also with mild portal hypertensive gastropathy.  Today he reports he was diagnosed with liver cirrhosis in 2021. He states at the time he had a syncopal episode and was noted to have ascites. He reports issues with 2 prior hernias which he had repaired and is now dealing with a new hernia. He reports experiencing pain and discomfort from the hernia which is his most bothersome symptom. He also reports 3 prior hospitalizations for HE, last hospitalization was in 7/2024. He has since been maintaining a bowel regimen xifaxin, dulcolax and miralax PRN. He reports 2-4 BMs daily.   He denies discoloration of the skin/eyes, LE edema, melena, or n/v/d.   Of note he was also noted to have a PFO on his prior TTE in 2/2024. He reports he has not had this repaired.   Of note patient reports he has seen 4 prior surgeons for TIPS consultation (unsure where reports maybe [facility]) who have declined placing a TIPS reportedly due to patient's hx and clinical status.  Hep: Korin Law Cards: Patricia Quinones  Current medications: Eliquis 2.5 mg BID Rifaximin 550 mg BID Lasix 80 mg twice daily Aldactone 100 mg daily Cefpodoxime 200 mg daily folic acid  thiamine Bupropion 150 mg daily mirtazapine 7.5 mg nightly Protonix 40 mg daily Ferrous sulfate 325 mg daily Calcium 600 mg daily. Multivitamin once daily

## 2025-07-21 NOTE — ASSESSMENT
[FreeTextEntry1] : Mr. 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 who presents to IR for consultation for TIPS.  1. Decompensated Cirrhosis c/b ascites and EV - Pt with decompensated cirrhosis with recurrent ascites refractory to medical therapy and prior hx of EV bleed - 6/25/25 CT reviewed and discussed with pt > Stable appearance of cirrhotic liver. No evidence of neoplasm. 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 - Discussed some potential risks of TIPS, including: (1) technical failure of the procedure; (2) procedural complications such as bleeding; (3) hepatic encephalopathy; (4)pulmonary edema or RV failure post-TIPS, and (5) liver failure due to ischemic liver injury. - 2/15/24 TTE: small PFO noted - Portal vein evaluation- chronic PVT on eliquis-reviewed on 6/25/25 CT - MELD: 11 (7/2-7/3 labs) - Given patient's hx of PVT and PFO he is a very high risk candidate for TIPS, especially for potential stroke - Additionally, given prior HE episodes patient is likely to become further encephalopathic - TIPS placement is anatomically not feasible due to small liver size, portal venous collateral and no true intrahepatic portal vein - given variceal bleeding is controlled at this time would defer TIPS placement due to above reasons - will discuss above with Dr. Ruiz - continue with consistent f/u with Dr. Ruiz  If moving forward with interventin:  2. PFO - TTE 2024 with mild LVH normal biV function, normal valves, +PFO - managed by Dr. Patricia Quinones - scheduled for TTE and CTCA 8/11 - will require cardiology clearance from Dr. Quinones  I have provided the patient the opportunity to ask questions and have answered them to their satisfaction.  They are encouraged to contact our office with any further questions, concerns, or issues.

## 2025-07-21 NOTE — HISTORY OF PRESENT ILLNESS
[FreeTextEntry1] : Here with leonel.   Mr. 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 who presents to IR for consultation for TIPS.   Patient with hx as above. He is currently listed for liver transplant. He has a hx of multiple incarcerated hernia's s/p repair, pending hernia repair however not cleared from hepatology yet. He is referred to IR for TIPS consultation by Dr. Ruiz.   Pt with hx of ascites, currently on lasix 80mg BID and aldactone 100mg daily, requiring paracentesis q 2 weeks for the last 6 weeks. Previously he was requiring paracentesis q 3months for 2 years. He also has a hx of EV Bleed in 4/2024. No further bleeding. He had a recent EGD 6/19/25-Grade 1 esophageal varices were noted, scarring from previous banding was noted, also with mild portal hypertensive gastropathy.  Today he reports he was diagnosed with liver cirrhosis in 2021. He states at the time he had a syncopal episode and was noted to have ascites. He reports issues with 2 prior hernias which he had repaired and is now dealing with a new hernia. He reports experiencing pain and discomfort from the hernia which is his most bothersome symptom. He also reports 3 prior hospitalizations for HE, last hospitalization was in 7/2024. He has since been maintaining a bowel regimen xifaxin, dulcolax and miralax PRN. He reports 2-4 BMs daily.   He denies discoloration of the skin/eyes, LE edema, melena, or n/v/d.   Of note he was also noted to have a PFO on his prior TTE in 2/2024. He reports he has not had this repaired.   Of note patient reports he has seen 4 prior surgeons for TIPS consultation (unsure where reports maybe [facility]) who have declined placing a TIPS reportedly due to patient's hx and clinical status.  Hep: Korin Law Cards: Patricia Quinones  Current medications: Eliquis 2.5 mg BID Rifaximin 550 mg BID Lasix 80 mg twice daily Aldactone 100 mg daily Cefpodoxime 200 mg daily folic acid  thiamine Bupropion 150 mg daily mirtazapine 7.5 mg nightly Protonix 40 mg daily Ferrous sulfate 325 mg daily Calcium 600 mg daily. Multivitamin once daily

## 2025-07-21 NOTE — HISTORY OF PRESENT ILLNESS
[FreeTextEntry1] : Here with leonel.   Mr. 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 who presents to IR for consultation for TIPS.   Patient with hx as above. He is currently listed for liver transplant. He has a hx of multiple incarcerated hernia's s/p repair, pending hernia repair however not cleared from hepatology yet. He is referred to IR for TIPS consultation by Dr. Ruiz.   Pt with hx of ascites, currently on lasix 80mg BID and aldactone 100mg daily, requiring paracentesis q 2 weeks for the last 6 weeks. Previously he was requiring paracentesis q 3months for 2 years. He also has a hx of EV Bleed in 4/2024. No further bleeding. He had a recent EGD 6/19/25-Grade 1 esophageal varices were noted, scarring from previous banding was noted, also with mild portal hypertensive gastropathy.  Today he reports he was diagnosed with liver cirrhosis in 2021. He states at the time he had a syncopal episode and was noted to have ascites. He reports issues with 2 prior hernias which he had repaired and is now dealing with a new hernia. He reports experiencing pain and discomfort from the hernia which is his most bothersome symptom. He also reports 3 prior hospitalizations for HE, last hospitalization was in 7/2024. He has since been maintaining a bowel regimen xifaxin, dulcolax and miralax PRN. He reports 2-4 BMs daily.   He denies discoloration of the skin/eyes, LE edema, melena, or n/v/d.   Of note he was also noted to have a PFO on his prior TTE in 2/2024. He reports he has not had this repaired.   Of note patient reports he has seen 4 prior surgeons for TIPS consultation (unsure where reports maybe [facility]) who have declined placing a TIPS reportedly due to patient's hx and clinical status.  Hep: Korin Law Cards: Patricia Quinones  Current medications: Eliquis 2.5 mg BID Rifaximin 550 mg BID Lasix 80 mg twice daily Aldactone 100 mg daily Cefpodoxime 200 mg daily folic acid  thiamine Bupropion 150 mg daily mirtazapine 7.5 mg nightly Protonix 40 mg daily Ferrous sulfate 325 mg daily Calcium 600 mg daily. Multivitamin once daily

## 2025-07-21 NOTE — REASON FOR VISIT
Interval History: Doing well on home vent. Tolerating feeds.     Objective:     Vital Signs (Most Recent):  Temp: 98.9 °F (37.2 °C) (02/02/22 0800)  Pulse: (!) 140 (02/02/22 1200)  Resp: (!) 82 (02/02/22 1200)  BP: (!) 100/51 (02/02/22 1200)  SpO2: 95 % (02/02/22 1200) Vital Signs (24h Range):  Temp:  [97.2 °F (36.2 °C)-98.9 °F (37.2 °C)] 98.9 °F (37.2 °C)  Pulse:  [137-141] 140  Resp:  [36-87] 82  SpO2:  [88 %-100 %] 95 %  BP: ()/(37-60) 100/51     Weight: 4.035 kg (8 lb 14.3 oz)  Body mass index is 17.15 kg/m².     SpO2: 95 %  O2 Device (Oxygen Therapy): ventilator   Vent Mode: SPONT-VS  Resp Rate Total:  [39 br/min-70 br/min] 51 br/min  Vt Set:  [50 mL] 50 mL  PEEP/CPAP:  [10 cmH20] 10 cmH20  Pressure Support:  [12 olV38-72 cmH20] 12 cmH20  Mean Airway Pressure:  [13.2 elO14-51.2 cmH20] 15.6 cmH20      Intake/Output - Last 3 Shifts       01/31 0700  02/01 0659 02/01 0700 02/02 0659 02/02 0700 02/03 0659    NG/.7 538.7 130.4    Total Intake(mL/kg) 533.7 (136.3) 538.7 (133.5) 130.4 (32.3)    Urine (mL/kg/hr) 389 (4.1) 374 (3.9) 14 (0.6)    Emesis/NG output 0      Stool 0 0 0    Total Output 389 374 14    Net +144.7 +164.7 +116.4           Stool Occurrence 3 x 2 x 1 x    Emesis Occurrence 1 x            Lines/Drains/Airways     Drain                 NG/OG Tube 12/14/21 1700 Cortrak 6 Fr. Right nostril 49 days          Airway                 Surgical Airway 01/27/22 0900 [place] Water Cuff 6 days                Scheduled Medications:    albuterol sulfate  2.5 mg Nebulization Q8H    budesonide  0.5 mg Nebulization Daily    bumetanide  0.5 mg Per NG tube Q8H    chlorothiazide  50 mg Per NG tube Q8H    erythromycin ethylsuccinate  16 mg Per NG tube Q12H    esomeprazole magnesium  5 mg Oral Before breakfast    lorazepam  0.5 mg Per NG tube Q8H    methadone  0.4 mg Per G Tube Q8H    pediatric multivitamin with iron  1 mL Per G Tube Daily    sennosides 8.8 mg/5 ml  2 mL Oral Q24H    sildenafil  5 mg  Per OG tube Q8H    spironolactone  5 mg Per NG tube Q12H       Continuous Medications:       PRN Medications: acetaminophen, glycerin pediatric, lorazepam, melatonin, morphine, polyethylene glycol, potassium chloride, Questran and Aquaphor Topical Compound, simethicone, sodium chloride      Physical Exam  GENERAL: Audible air leak. Trivial edema. Cushingoid facies, unchanged. Good color.Sleeping  HEENT: AFSF. Eyes closed. Nose normal. Mucous membranes moist and pink. Trach in place.   CHEST: Mild tachypnea, mild subcostal retractions. Coarse vented breath sounds bilaterally. Trach leak  CARDIOVASCULAR: Paced rate at 140 bpm. Regular rhythm. Normal S1 and single S2, 2/6 systolic murmur.  No gallop.  ABDOMEN: Soft, mildly-distended, normal bowel sounds. Liver 2 cm below RCM.  EXTREMITIES: Warm well perfused. Cap refill < 3sec.   NEURO: No abnormal tone.  SKIN: No rash.      Significant Labs:   Lab Results   Component Value Date    WBC 15.58 01/20/2022    HGB 11.8 01/20/2022    HCT 37 01/27/2022    MCV 92 (H) 01/20/2022     (H) 01/20/2022     BMP  Lab Results   Component Value Date     (L) 01/31/2022    K 4.7 01/31/2022    CL 89 (L) 01/31/2022    CO2 24 01/31/2022    BUN 30 (H) 01/31/2022    CREATININE 0.5 01/31/2022    CALCIUM 11.4 (H) 01/31/2022    ANIONGAP 20 (H) 01/31/2022    ESTGFRAFRICA SEE COMMENT 01/31/2022    EGFRNONAA SEE COMMENT 01/31/2022     LFT  Lab Results   Component Value Date    ALT 20 01/31/2022    AST 57 (H) 01/31/2022    ALKPHOS 219 01/31/2022    BILITOT 0.2 01/31/2022     Prealbumin   Date Value Ref Range Status   01/31/2022 23 14 - 30 mg/dL Final     MICRO  Microbiology Results (last 7 days)     Procedure Component Value Units Date/Time    Culture, Respiratory with Gram Stain [[id number]]  (Abnormal)  (Susceptibility) Collected: 01/27/22 1254    Order Status: Completed Specimen: Respiratory from Tracheal Aspirate Updated: 01/29/22 1023     Respiratory Culture No S aureus or  Pseudomonas isolated.      KLEBSIELLA PNEUMONIAE  Few       Gram Stain (Respiratory) <10 epithelial cells per low power field.     Gram Stain (Respiratory) Rare WBC's     Gram Stain (Respiratory) No organisms seen        Significant Imaging: Personally reviewed imaging in the last 24 hours    CXR: Tracheostomy cannula again noted, as is a cardiac pacing device with epicardial leads.  Enteric tube tip lies in the gastric fundus.  Heart size and the appearance of the cardiomediastinal silhouette have not changed appreciably since the examination referenced above.  Lung zones are also stable, with no new areas of airspace consolidation or volume loss having developed.  No pleural fluid.  No pneumothorax.    Echo 1/31/22:  History of congenital high grade heart block.  - s/p epicardial pacemaker (8/23/21)  - s/p pericardial window (10/18/21).  No significant change from last echocardiogram.  Small secundum atrial septal defect vs. patent foramen ovale. Atrial bi-directional shunt, primarily left to right.  Dilated MPA. Normal pulmonary artery branches.  Trivial aorto-pulmonary collateral.  Qualitatively, the RV is mildly hypertrophied and dilated with normal systolic function.  Normal left ventricle structure and size. Normal left ventricular systolic function.  No pericardial effusion.  Flattened septum consistent with right ventricular pressure overload.  Right ventricle systolic pressure estimate moderately increased based on septal position.   [FreeTextEntry1] : TIPS [Other: _____] : [unfilled]

## 2025-07-29 NOTE — PHYSICAL EXAM
[General Appearance - Alert] : alert [General Appearance - In No Acute Distress] : in no acute distress [] : no respiratory distress [Respiration, Rhythm And Depth] : normal respiratory rhythm and effort [Auscultation Breath Sounds / Voice Sounds] : lungs were clear to auscultation bilaterally [Heart Rate And Rhythm] : heart rate was normal and rhythm regular [Edema] : there was no peripheral edema [Bowel Sounds] : normal bowel sounds [Abdomen Soft] : soft [Abnormal Walk] : normal gait [No Focal Deficits] : no focal deficits [Oriented To Time, Place, And Person] : oriented to person, place, and time [Impaired Insight] : insight and judgment were intact [Affect] : the affect was normal [Scleral Icterus] : No Scleral Icterus [Asterixis] : no asterixis observed [Jaundice] : No jaundice [FreeTextEntry1] : Tenderness within ventral hernia region (reducible), no peritoneal signs

## 2025-07-29 NOTE — ASSESSMENT
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 Listed for OLT on 6/27/2025.  #Decompensated cirrhosis 2/2 likely MASH: MELD = 12 (based on labs from 7/2/2025) - HE: AO x 3.  (Pt reports nausea with lactulose). Continue rifaximin 550mg BID.  - Ascites/Volume: Given PEYTON with prior diuretic dosing (Lasix 80 mg twice daily, Aldactone 150 mg daily), will resume diuretics at a lower dose: Lasix 80 mg daily, Aldactone 150 mg daily.  Repeat labs in 1-2 weeks.  Continue low Na diet.  Patient reports drinking ~4 L water per day; advised him to fluid restrict to 2 L/day.  Monitor Cr (baseline Cr 0.7-0.8s). Continue LVP PRN at [facility].  As per discussion at liver selection meeting on 6/27/2025, will refer to [facility] IR for consideration of TIPS.  - Hx of SBP: Had E coli bacteremia 2/2 SBP (E coli resistant to Cipro/Bactrim) in 2/2024. Continue cefpodoxime 200mg daily for SBP ppx.  - Hx of EV bleed: EGD (6/2025) => small nonbleeding EV.  Patient was previously on midodrine for hypotension and thus was not on an NSBB.  However, now patient is off midodrine; will hold off on starting NSBB pending optimization of diuretic regimen as above.  - HCC screening: CT A/P (triple phase; 6/2025) without focal liver lesion. Repeat imaging, AFP q6mo (next due in 12/2025).  - Chronic PVT: CT A/P (4/2024) showed main portal venous occlusive thrombosis with extension into the visualized bilateral portal veins. Was started on Eliquis during [facility] admission in 4/2024; given recent fall, he was discharged on 5/25 OFF of AC. Eliquis restarted at lower dose of 2.5mg BID on 6/3/24 -- continue. No falls since last visit. CT A/P (triple phase; 6/2025) showed stable chronic main PVT with cavernous transformation.  - Counseled pt to avoid hepatotoxins, alcohol use, herbal supplements. Limit acetaminophen to 2g/day. - Counseled pt on the importance of medication adherence, and to bring all his medication bottles in to every hepatology visit moving forward.  #Ventral Hernia: - I informed the pt that given his decompensated cirrhosis, hernia repair at this time would be high-risk (likely to have further decompensation/risk of death post-op).  Patient saw Dr. Dagher for second opinion regarding hernia repair on 6/18/2025 with plan to optimize his diuretics and further discuss his transplant candidacy before proceeding with repair.  - Referred to [facility] IR for consideration of TIPS as above.  #Current tobacco smoker: - Patient was counseled on the importance of smoking cessation.  Repeat MELD labs in 1 to 2 weeks (diuretics adjusted today). RTC in 4 weeks.

## 2025-07-29 NOTE — HISTORY OF PRESENT ILLNESS
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  Listed for OLT on 2025. Blood Type: O    PRIOR DATA: -24: WBC 1.76, Hgb 8.0 (MCV 83.7), plt 48. Na 137, K 4.3, Cr 0.83. AST/ALT 40/22, TB 1.3, , Alb 3.0, TP 6.3. INR 1.58. [MELD 3.0 = 12] -6/3/24: Seen for post-discharge follow up (discharged from [facility] on ). Pt brought in the following med bottles today: cefpodoxime x2, Eliquis 5mg, folic acid, MVI. He reports he does not have the other meds that are missing. He reports that he ran out of multiple medications a few days ago, and some also got lost in the transition from moving from his Aunt Dori's house to his current residence (his sister Astrid's house). He reports that he is planning to move out of Isaiahs house to live with his Aunt Siri this week. He reports that he is moving out of Isaiahs house as it is too hectic in her home (she has 7 children). He denies having any falls or episodes of HE since discharge. As per pt, he stopped Eliquis after leaving the hospital. Started on Eliquis 2.5mg BID. Advised pt to bring in all med bottles to next appt in 2w. WBC 2.1, Hgb 9.0 (MCV 88), Plt 61. Na 140, K 4.0, Cr 0.69. AST/ALT 43/21, TB 1.3, , Alb 3.2, TP 6.5. INR 1.62. [MELD 3.0 = 12] -24: Seen for follow up. He did not bring in his med bottles. Last LVP was  (2.5L) and next LVP was scheduled for ~ . Pt reports he is living 1/2 week with one aunt (Mariah) and remainder of week with the other aunt (Dori). Follows low Na diet. Reports good appetite. Pt is not staying with sister Alyssia anymore. Pt is currently in [facility] program and has gone to 3 meetings, one on one therapy with a psychiatrist. Has next session later this week. WBC 2.74, hgb 10.2, Plt 59. Na 141, K 4.2, Cr 0.76. AST/ALT 45/18, TB 1.3, , Alb 3.1, TP 7.0. INR 1.51. [MELD 3.0 = 12]. -7/15/24: Seen for follow up. Came to visit alone. Ran out of rifaximin 4-5d ago. Forgot to take his meds 2-3x over past 2 weeks. Vandana (his SO) is pending approval to be his HHA. Endorses enlarged L sided abd hernia, has NOT been getting LVP regularly, advised he needs to get this q2w. Decreased midodrine from 10mg to 5mg TID (systolic BP in 120s today). WBC 3.13, Hgb 10.9 (MCV 87.5). Plt 58. Na 139, Kk 4.1, Cr 0.73. AST/ALT 44/19, DB/TB 0.5/1.7, , Alb 3.3, TP 7.0. INR 1.62. [MELD 3.0 = 13]. PETH neg. -24: CT Abd w/wo IV contrast => Findings of chronic hepatic cirrhosis with sequelae of chronic portal hypertension including cavernous transformation of the portal vein with chronic portal vein thrombosis, splenomegaly and upper abdominal varices. There is chronic calcified occlusive thrombus within the main portal vein consistent with portal vein thrombosis, unchanged since 2024. Large volume of abdominal ascites. No biliary ductal dilatation. GB distended with fluid. No suspicious hepatic lesion. Small fat-containing inguinal hernias. Fluid within a small periumbilical hernia. -24: Seen for follow up. Reports he went to [facility] in early 2024 for abd pain, was in ER for 15h, got dx tap and then left AMA. Went to [facility] for LVP the next day 8/10, got 6L removed. Got albumin replacement with LVP. Next LVP sched for tomorrow morning at [facility]. Spending more time at Aunt Dori's home (5d/week), 2d/week with Aunt Siri. Reports he is attending [facility] RPP - last session was 1-2 weeks ago. Normally supposed to have 2 sessions/week but counselor left recently. WBC 2.52, Hgb 11.7, Plt 49. Na 143, K 4.2, Cr 0.68. AST/ALT 52/18, DB/TB 0.6/1.8, , Alb 3.2, TP 7.2. INR 1.58. [MELD 3.0 = 13]. -24: We called pt - advised him to increase diuretics to Lasix 40mg BID and aldactone 100mg daily. -24: Seen for follow up. Getting LVP weekly at [facility] now with 4-4.5L each time. Did not increase Lasix and spironolactone as instructed, and it appears that pharmacy had been giving him midodrine 10mg TID instead of 5mg TID. Complains about hernia pain which says is impacting his QOL and limiting his ability to go to appointments. Trying to get a second opinion regarding surgery. Taking Tylenol 500mg every other day. Last regular RPP meeting was ~1.5 mo ago. Pt states current counselor had been away. Pt had been advised to increase Lasix 40mg from daily to BID and aldactone from 50mg to 100mg daily on 24 (new Rx were sent last visit) but pt did NOT make the change (still taking Lasix 40mg daily, aldactone 50mg daily. He states he does not recall our conversation regarding increasing the diuretics. -24: WBC 2.53, hgb 11.3, Plt 53. Na 138, K 3.7, Cr 0.68. ASt/ALT 43/17, DB/TB 0.4/1.2, , Alb 3.1, TP 6.7. INR 1.42. [MELD 3.0 =11]. PET neg. UTox: +THC. -10/2/24: Increased diuretics: Lasix 40mg daily to BID, Aldactone 50mg to 100mg daily. Contacted pt's pharmacy and cancelled the old prescriptions to avoid further dosage confusion moving forward. -10/24/24: Seen for follow up. Did not bring in med bottles today (says he forgot because he was rushing out of the house today) but was able to confirm above meds verbally. Did not increase Lasix 40mg to BID because he is urinating too frequently. Still taking lasix 40mg daily. He IS taking aldactone 100mg daily. WBC 3.15, hgb 12.0, Plt 56. Na 139, K 3.5, Cr 0.70. AST/ALT 42/20, DB/TB 0.5/1.3, , Alb 3.0, TP 6.7. INR 1.51. [MELD 3.0 = 12]. PETH neg. UTox +THC. -24: Seen for follow up with Dr. Medina. WBC 3.26, hgb 11.3, plt 54. Na 136, K 3.8, Cr 0.87. AST/ALT 46/18, TB 1.7, , Alb 3.0, TP 6.7. INR 1.43. [MELD 3.0 = 13]. -24: CT A/P w/wo contrast => IMPRESSION: 1. Stable cirrhotic changes in liver. No hepatic lesions/HCC noted. 2. Increased size of anterior abdominal hernias 3. As noted previously chronically occluded portal vein with cavernous transformation (stable). 4. Grossly stable large volume ascites -24: Seen for f/u. Went to get LVP yesterday but was told he did not have sufficient ascites for paracentesis! Prior to that, last paracentesis was 3w ago - 1L removed. WBC 3.01, hgb 11.7, Plt 51. Na 138, K 4.1, Cr 0.82. AST/ALT 50/22, DB/TB 0.5/1.4, , Alb 3.2, TP 7.0. INR 1.50. [MELD 3.0 = 12]. AFP < 1.8. PETH neg. UTox +THC, +Cocaine. -25: Seen for hep f/u. Weight 182# (24) -> 178# today. Attributes his weight loss to cutting down on junk food. Filled out the [facility] enrollment forms. Going to [facility] tomorrow for LVP. WBC 3.31, hgb 12.3, Plt 53. Na 135, K 3.7, Cr 0.77. AST/ALT 48/19, DB/TB 0.4/1.4, , Alb 3.0, TP 7.3. INR 1.48. [MELD = 14]. PETH neg. UTox +THC only. -25: Seen for hep f/u. Did not bring in his meds. Reports that he was started on mirtazapine 7.5mg qhs by psychiatrist 1w ago for sleep. WBC 2.88, Hgb 11.9 (MCV 92), Plt 53. Na 139, K 3.9, Cr 0.78. AST/ALT 51/20, DB/TB 0.4/1.0, , Alb 2.9, TP 6.8. INR 1.45. [MELD = 10]. UTox +THC. PETH neg. - 3/11/25: WBC 2.7, Hbg 11.7, Hct 34.5, PLT 44. INR 1.52/PT 17.3, Na 138, K 3.4, Glu 98, Cr 0.7, AST/ALT 48/21,, TB 1.5, - 25: WBC 4.04, Hgb 12.5 (MCV 87), PLT 54, PT/INR 16.9/1.44 , Na 138, K 3.8, Cr 0.74, AST/ALT  71/29, , Albumin 2.7, TB 1.8 [MELD = 13], UTox +THC, +Methadone, PETH neg.  - 2025: Hepatology follow-up appointment.  Patient reports that his hernia has been more bothersome than usual and has not been reducible over the last couple days.  Has LVP scheduled at [facility] on Thursday.  On exam, patient was noted to have a large reducible ventral hernia in the LUQ, and a smaller central ventral hernia that was not reducible.  Due to the patient's report of increased pain and the nonreducible nature of the the central ventral hernia, patient was sent to the ED for urgent imaging.  CT A/P with IV/p.o. contrast => IMPRESSION: Large adjacent fluid-filled ventral abdominal wall recurrent hernias and's small fluid-filled umbilical hernia.  Cirrhosis with sequela of portal venous hypertension as described notably moderate volume abdominal pelvic ascites, consider paracentesis.  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  Recannulized periumbilical vein.  No biliary ductal dilatation.  Distended gallbladder which may be secondary to prolonged n.p.o. status.  No gallbladder wall thickening.  Splenomegaly (19 cm).  Pancreas within normal limits.  Lower esophageal, gastrohepatic, omental, and perigastric varices.  Splenorenal varices but shunt.  No lymphadenopathy. -25: S/p 2.4 L paracentesis (SBP negative; TP 0.9). - 2025: WBC 1.78 (ANC 1440), Hgb 10.2 (MCV 90.7), Plt 46. Na 135, K 4.3, Cr 0.81. Mg 1.8, Phos 3.0. AST/ALT 34/14, TB 0.9, , Alb 2.2, TP 5.4. INR 1.56. - 2025: Hepatology follow-up appointment.  Abdominal ultrasound ordered (not done). WBC 4.15, Hgb 12.5 (MCV ), Plt 72. Na 143, K 4.0, Cr 0.91. AST/ALT 68/37, DB/TB 0.5/1.6, , Alb 3.1, TP 7.6. INR 1.43. [MELD = 12].  AFP <1.8. PETH neg. UTox +THC. -25: Surgery NPA with Dr. Guzman Christensen.  Scheduled for hernia repair on 2025. - 2025: Hepatology follow-up appointment.  Brought in all of his med bottles today.  Ports that he misses 1-2 doses of his diuretics per week.  Confirmed with patient that he stopped his midodrine in 2025.  Has EGD/colonoscopy on 2025 with 2-day MoviPrep.  Advised to hold Eliquis x 2 days prior to procedure.  Patient also came in with paperwork requesting hepatology clearance for hernia repair that is already scheduled for 2025 with Dr. Dewitt.  I advised patient that he is NOT clear from hepatology standpoint.  Patient was referred to liver surgery (Dr. Dagher) for second opinion. WBC 2.80, Hgb 11.9 (MCV 88.4), Plt 69. Na 140, K 4.3, Cr 0.85. AST/ALT 60/30, DB/TB 0.64/1.3, , Alb 3.2, TP 6.6. INR 1.48. [MELD = 11]. PETH neg. UTox +THC. - 2025: EGD => small nonbleeding EV.  Scarring from previous EV banding noted.  Mild PHG.  No GV.  Normal duodenum. COL => BBPS 2/3/2.  Normal TI.  One 2-3 mm sessile polyp in sigmoid colon removed (PATH: Hyperplastic polyp).  There were a few diminutive hyperplastic appearing polyps in the left colon that were not removed.  Medium sized nonbleeding rectal varices.  Recommended repeat colonoscopy for CRC screening purposes in 7 to 10 years. Increased patient's diuretics from Lasix 40 mg twice daily to 80 mg twice daily.  Increased Aldactone from 100 mg daily to 150 mg daily. Advised to repeat labs in 1 week. -2025: CT C/A/P (triple phase) => IMPRESSION: 1.  Stable appearance of cirrhotic liver. No evidence of neoplasm. Stable appearance of chronic thrombosis of main portal vein with cavernous transformation. 2.  Stable moderate ascites. 3.  Stable large recurrent ventral hernia. Trace left pleural effusion, stable.  No suspicious pulmonary nodules, no areas of airspace consolidation. -2025: Na 140, K 4.7, Cr 1.23 (eGFR 70).  AST/ALT 68/29, DB/TB 0.86/1.9, , Alb 2.9, TP 6.9. INR 1.56.  AFP 1.5, DCP 0.5. - 2025: Patient was advised to hold all diuretics given PEYTON. Patient was discussed at liver selection committee, listed for OLT.  Also discussed consideration of referral to IR (Dr. Betancourt) for TIPS for inflow/control of ascites given chronic PVT.  Patient was advised to stop smoking. -25: WBC 3.46, Hgb 13.4, Plt 56. Na 138, K 4.0, Cr 0.81. AST/ALT 78/38, DB/TB 0.68/1.3, , Alb 2.9, TP 6.9. INR 1.43.  [MELD = 12]. PETH negative.  Utox +THC. - 7/3/2025: Hepatology follow-up appointment.  Patient reports that he stopped his diuretics on 2025. Pt did NOT bring in med bottles so unable to confirm what he is taking (patient reports that he did not sleep at his home last night, and therefore was unable to bring in the med bottles). Reports he had LVP  at [facility]: 5.5L was taken out. Ascites has not reaccumulated since then.  Given PEYTON with prior diuretic dosing (Lasix 80 mg twice daily, Aldactone 150 mg daily), will resume diuretics at a lower dose: Lasix 80 mg daily, Aldactone 150 mg daily (written instructions given to pt).  Patient was advised to get repeat labs done in 1 to 2 weeks (not done on time, our office had to call to remind the patient to get these done). -25: IR consult at [facility] for TIPS (Dr. Albarran): "TIPS placement is anatomically not feasible due to small liver size, portal venous collateral and no true intrahepatic portal vein." - 2025: WBC 4.05, Hgb 12.5 (MCV 91), Plt 61. Na 139, K 5.7 (not hemolyzed), Cr 1.07. AST/ALT 77/38, DB/TB 0.85/2.1, , Alb 3.0, TP 7.2. INR 1.33. [MELD = 14]. RUDY Phillips called and spoke with pt: Despite being provided with written instructions regarding the diuretic dosage at his last hepatology visit on 7/3/2025, patient was not adherent to the recommended diuretic dosing.  Instead, patient stated that he was taking Lasix 80 mg twice daily, Aldactone 100 mg daily since 2025.  Patient also reported that he had 2.1L paracentesis at [facility] 1.5 weeks ago.  Patient reported minimal ascites, no LE edema.  Denies any discomfort.  Prescribed Lokelma 10g daily x 2 days, low K diet, repeat labs on 2025.  He was also advised to decrease Aldactone from 100 mg to 50 mg daily.  Continue Lasix 80 mg twice daily. -25: Na 135, K 4.3, Cr 1.06. AST//40, DB/TB 0.69/1.4, , Alb 3.2, TP 6.9.  Interval Hx 25: Current meds: Lasix 80mg BID, Aldactone 50mg daily. Eliquis 2.5 mg BID ( have not been taking. Does not rememmber for how long), Rifaximin 550 mg BID. Cefpodoxime 200 mg daily (ran out 3 weeks ago). folic acid, thiamine daily. [STOPPED Midodrine 10mg TID in 2025]. Other meds: [Bupropion 150 mg daily, mirtazapine 30mg (increased from 7.5 mg nightly -- a week ago). Protonix 40 mg daily.  Ferrous sulfate 325 mg daily (stopped. don't remember when). Calcium 600 mg daily. Gabapentin 300mg TID for hernia pain.  Pt took Lokelma on -. Pt is currently on spironolactone 50mg qd since 25. Looking at the med box, pt is actually taking furosemide 40mg AM and 120mg PM, instead of the 80mg BID for a week. Pt reports on and off leg cramps for the last 1 week.  A week and a half ago, he had hand tremor for 1 day. Denies fever, chills, CP, blood in urine/stool, black stool, hematemesis, edema, confusion, jaundice, ascites. Speak to counselor once a week. Attending group session 1-2x/week. Last LVP was ~ 7/15/25, where they got around 2.1L.  Denies any ascites since then.  Pt states he is not taking Eliquis since 1 month ago because he thought he cannot take it. Reports poor sleep. Reports he sleeps 6 hours a night. Reports 2-3BM/day.    [ ] why was he not adherent to recommended diuretic dosing? [ ] RPP attendance? [ ] med/herbal rec. DId he bring in med bottles and list? [ ] HE, ascites, LE edema?   Interval history 7/3/2025: Current meds: Eliquis 2.5 mg BID, Rifaximin 550 mg BID. Cefpodoxime 200 mg daily.  MVI, folic acid, thiamine daily. [STOPPED Midodrine 10mg TID in 2025]. Other meds: [Bupropion 150 mg daily, mirtazapine 7.5 mg nightly -- ran out 1-2w ago]. Protonix 40 mg daily.  Ferrous sulfate 325 mg daily.  Calcium 600 mg daily.  Reports he stopped his diuretics last 25. Pt did NOT bring in med bottles so unable to confirm what he is taking (patient reports that he did not sleep at his home last night, and therefore was unable to bring in the med bottles).  Pt reports he was urinating more when he was on Lasix 80mg BID (UOP 1.5L daily) compared to when he was taking Lasix 40mg BID, however also reports that he drinks 4 L of water a day. Reports regular BMs, had BM this AM, passing flatus.  Smokes 1 cigarette every 2 days. Reports marijuana is the only thing controlling his pain and sleep, so he still smokes it daily.  Reports nausea and "spitting up bile (yellow and green)". Reports he had LVP  at [facility]: 5.5L was taken out. Ascites has not reaccumulated since then.  Despite this, patient reports his hernia is worsening regardless, causing constant pain, and he has been unable to eat or sleep.  Denies fever, chills, CP, blood in urine/stool, black stool, edema, confusion, jaundice.  Attending RPP 1-2x/week. Wednesday and Thursday are group sessions. Psychiatrist one on one meeting once a month. Last time was 2 weeks ago.   24Hr Diet recall: Bfast: pineapple/banana/apple smoothie Lunch: corn muffin  Dinner: small bowl of mashed potatoes and a piece of chicken, 1/4 cup of apple juice Snack: does not recall Drinks:    Interval history 2025: - Sched for EGD/COL on 25 with 2d Moviprep. Hold Eliquis x2d prior to procedure. - Patient is scheduled for hernia repair on 2025 despite being a decompensated cirrhotic with ascites.  Patient is NOT cleared from a hepatology standpoint. Current meds: Eliquis 2.5 mg BID, Rifaximin 550 mg BID.  Lasix 40 mg twice daily, Aldactone 100 mg daily.   Cefpodoxime 200 mg daily.  MVI, folic acid, thiamine daily. [STOPPED Midodrine 10mg TID in 2025]. Other meds: [Bupropion 150 mg daily, mirtazapine 7.5 mg nightly -- ran out 1-2w ago]. Protonix 40 mg daily.  Ferrous sulfate 325 mg daily.  Calcium 600 mg daily. Multivitamin once daily, Aldactone 100 mg/d, Midodrine 10 mg TID, Mirtazapine 7.5 mg nightly, Cefpodoxime 200 mg/d, Ferrous sulfate 325 mg/d, Protonix 40 mg/d, Bupropion 150 mg/d, Folic acid 1 mg/d, Vitamin B-1 100 mg/d, Lasix 40 mg BID, Calcium 600 mg/d.  Pt reports he did NOT receive any instructions regarding stopping his Eliquis/iron supplementation prior to the procedures sched for Thurs. He has NOT taken his Eliquis/iron yet today -- advised him to hold prior to EGD/COL on . Confirmed with pt that he stopped his midodrine.  Misses 1-2 doses of diuretics per week. He brought in all of his med bottles today. Switched health insurances -- now on straight Medicaid, needs to apply for secondary health insurance. Switch happened 1 week ago. Had LVP at [facility]  -- had ~3L removed, also had LVP 2w prior (early 2025) - 2.5L removed. Had a session in May -- 3L removed. Denies f/c, cp, sob, cough, abd pain, n/v/d, hematochezia/melena, dysuria/hematuria. Trying to eat low salt diet. Eating fruit, drinking Boost/Ensure 1 bottle/day. No LE edema, but they feel weak. No episodes of confusion. Having 3 BM/day, no hematochezia/melena.  Still using marijuana -- smokes and edibles. No tobacco, no alcohol.  Attending RPP 1-2x/week. Wednesday and Thursday are group sessions.  [ ] Hold Eliquis, ferrous sulfate.  Remind patient about 2-day MoviPrep for EGD/COL. [ ] Did he STOP midodrine? [ ] MELD labs, Utox, PETh [ ] BP? decrease midodrine to 5mg TID. [ ] last LVP? [ ] attending RPP?   Interval hx 25: Current meds: Eliquis 2.5 mg BID, Rifaximin 550 mg BID, Multivitamin once daily, Aldactone 100 mg/d, Midodrine 10 mg TID, Mirtazapine 7.5 mg nightly, Cefpodoxime 200 mg/d, Ferrous sulfate 325 mg/d, Protonix 40 mg/d, Bupropion 150 mg/d, Folic acid 1 mg/d, Vitamin B-1 100 mg/d, Lasix 40 mg BID, Calcium 600 mg/d.  - EGD sched for 5/15/25. [ ] med/herbal rec -- did he stop midodrine?  Interval History 25:  States he is getting a paracentesis on Thursday at [facility]. His last paracentesis was two weeks ago. States that his hernia has been bothering him worse than normal and it even bothers him even after getting his paracentesis. Reports intermittent pain in the hernia area, especially over the last few days. He is able to pass gas and have bowel movements but eating makes pain worse and he is no longer able to reduce the hernia like before. He otherwise denies fever/chills, severe abdominal pain, nausea/vomiting, melena/hematemesis. Denies any complaints asides from hernia tenderness.   Denies alcohol use. Denies any illicit drug use asides from marijuana use (sometimes smokes and sometimes takes edibles). Does not know why methadone was detected in his urine as he adamantly denies any drug use asides from marijuana. States he is willing to stop smoking marijuana and only use edibles to assess if that is the reason for a possible false-positive result. States he only went to RPP meeting once last week due to Zoom password malfunction and usually does biweekly meetings with group and psych.   Current meds: Eliquis 2.5 mg BID, Rifaximin 550 mg BID, Multivitamin once daily, Aldactone 100 mg/d, Midodrine 10 mg TID, Mirtazapine 7.5 mg nightly, Cefpodoxime 200 mg/d, Ferrous sulfate 325 mg/d, Protonix 40 mg/d, Bupropion 150 mg/d, Folic acid 1 mg/d, Vitamin B-1 100 mg/d, Lasix 40 mg BID, Calcium 600 mg/d.   Interval Hx 25: Current meds: Lasix 40mg BID, aldactone 50mg daily, midodrine 10mg TID, cefpodoxime 200mg daily for SBP ppx, Miralax 17g PRN, rifaximin 550mg BID. Pantoprazole 10mg BID, MVI, thiamine, folic acid. Eliquis 2.5mg BID. mirtazapine 7.5mg qhs (started in early 2025). Bupropion 75mg qd (started 3/2025 for sleep and depression) Taking collagen supplement. Brought in: lasix, cefpodoxime, rifaximin, MVI, folic acid, thiamine, Eliquis, mirtazapine, bupropion.  Last LVP 3/11/25. Since the next day, ascites has been accumulating quickly. After his LVP, his abdominal pain resolved. 2 weeks ago, pt developed abdominal pain and worsening of ventral hernia. 1 week ago, developed n/v. For the last 1 week, he vomitted twice and last time was this morning. Reports he vomitted clear liquid. Denies blood or anything brown. Denies f/c, cp, sob, cough, d/c, hematochezia/melena, dysuria/hematuria. Pt is scheduled for LVP tomorrow. Pt is not sure if he is taking the spironolactone which pt did not bring in. His furosemide bottle is also from 10/2024. There is not midodrine bottle with him today. He is not sure if he is taking it. Pt reports he is attending RPP 1-2 a week. Reports he is not attending twice a week consistently because of forgetfulness. Started Bupropion 75mg qd (started 3/2025 for sleep and depression). Taking Miralax every other day. Pt is having BM 3-4x/day. According to pt's pill box, he did not take Sat and 's medication. Pt reports in a week, he may miss 1-2 doses. Pt is living with his aunt, Dori, now. Dori's  has stage 4 cancer. Pt reports he is more forgetful such as appointments.   Interval Hx 25: Did not bring in his meds today. Current meds: Lasix 40mg BID, aldactone 50mg daily, midodrine 10mg TID, cefpodoxime 200mg daily for SBP ppx, Miralax 17g PRN, rifaximin 550mg BID. Pantoprazole 10mg BID, MVI, thiamine, folic acid. Eliquis 2.5mg BID. ?sleeping pill (started by psychiatrist 1w ago). Taking collagen supplement.  Reports that his psychiatrist prescribed med for sleeping -- doesn't know the name, started it 1 week ago. Took it 3-4 times in the past week. Not sleeping during the day. Denies f/c, cp, sob, cough, abd pain, n/v/d/c, hematochezia/melena, dysuria/hematuria. Appetite is good. Last LVP was yesterday 25 at [facility] - less than 1L removed.  - no fluid to be removed  - <1L removed Denies abd distention, but hernia is bothersome intermittently - 2-3x/week. Always able to reduce it. No LE edema. No episodes of HE. Has 3-5 BM/day. Going to RPP. Has attended 5-6 meetings virtually. Started in mid 2025. Meetings 2-3x/week. Last attended a session in end of last week. Went to dentist and had 1 molar removed (cracked)   Interval Hx 25: Current meds: Lasix 40mg BID, aldactone 50mg daily, midodrine 10mg TID, cefpodoxime 200mg daily for SBP ppx, Miralax 17g PRN, rifaximin 550mg BID. Pantoprazole 10mg BID, MVI, thiamine, folic acid. Eliquis 2.5mg BID. Weight 182# (24) -> 178# today. Attributes his weight loss to cutting down on junk food.  Reports he is looking for a new apt with his girlfriend Shruthi. Stays with her 2 days/week for the past 2 weeks. For the other 5 days, stays with Aunt Dori. Came from Shruthi's apt, therefore does not have his med bottles today. Did not bring in pill box either, forgot it at her apt. Denies f/c, cp, sob, cough, n/v/d/c, hematochezia/melena, dysuria/hematuria. Reports abd pain from hernia has improved significantly over last several weeks (due to improvement in ascites), currently 3/10. Sleeping well, no napping during daytime. No increased abd distention since last visit. No LE edema. No episodes of confusion. Has 2-3 BMs/day. Going to [facility] tomorrow for LVP. Planning to start collagen capsule supplement. Appetite is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